# Patient Record
Sex: FEMALE | Race: BLACK OR AFRICAN AMERICAN | Employment: UNEMPLOYED | URBAN - METROPOLITAN AREA
[De-identification: names, ages, dates, MRNs, and addresses within clinical notes are randomized per-mention and may not be internally consistent; named-entity substitution may affect disease eponyms.]

---

## 2017-05-04 ENCOUNTER — OFFICE VISIT (OUTPATIENT)
Dept: FAMILY MEDICINE CLINIC | Age: 52
End: 2017-05-04

## 2017-05-04 VITALS
HEART RATE: 59 BPM | BODY MASS INDEX: 42.85 KG/M2 | TEMPERATURE: 98.7 F | OXYGEN SATURATION: 100 % | WEIGHT: 251 LBS | RESPIRATION RATE: 16 BRPM | HEIGHT: 64 IN | DIASTOLIC BLOOD PRESSURE: 88 MMHG | SYSTOLIC BLOOD PRESSURE: 157 MMHG

## 2017-05-04 DIAGNOSIS — M25.562 CHRONIC PAIN OF BOTH KNEES: Primary | ICD-10-CM

## 2017-05-04 DIAGNOSIS — M25.561 CHRONIC PAIN OF BOTH KNEES: Primary | ICD-10-CM

## 2017-05-04 DIAGNOSIS — G89.29 CHRONIC PAIN OF BOTH KNEES: Primary | ICD-10-CM

## 2017-05-04 DIAGNOSIS — R60.0 LOCALIZED EDEMA: ICD-10-CM

## 2017-05-04 RX ORDER — CHOLECALCIFEROL (VITAMIN D3) 125 MCG
CAPSULE ORAL
COMMUNITY
End: 2017-06-15 | Stop reason: ALTCHOICE

## 2017-05-04 RX ORDER — HYDROCHLOROTHIAZIDE 12.5 MG/1
12.5 TABLET ORAL DAILY
Qty: 30 TAB | Refills: 5 | Status: SHIPPED | OUTPATIENT
Start: 2017-05-04 | End: 2018-09-09 | Stop reason: SDUPTHER

## 2017-05-04 NOTE — MR AVS SNAPSHOT
Visit Information Date & Time Provider Department Dept. Phone Encounter #  
 5/4/2017 10:30 AM Janne Ganser, MD [facility] Nanci  [id number] Your Appointments 6/15/2017  1:30 PM  
Follow Up with Janne Ganser, MD  
[facility]) Appt Note: f/u APPOINTMENT  
 [phone] [id number] Upcoming Health Maintenance Date Due DTaP/Tdap/Td series (1 - Tdap) 1/4/1985 PAP AKA CERVICAL CYTOLOGY 1/4/1985 BREAST CANCER SCRN MAMMOGRAM 1/4/2014 FOBT Q 1 YEAR AGE 50-75 1/4/2014 INFLUENZA AGE 9 TO ADULT 8/1/2017 Allergies as of 5/4/2017  Never Reviewed Not on File Current Immunizations  Never Reviewed No immunizations on file. Not reviewed this visit You Were Diagnosed With   
  
 Codes Comments Chronic pain of both knees    -  Primary ICD-10-CM: M25.561, M25.562, G89.29 ICD-9-CM: 719.46, 338.29 Vitals BP Pulse Temp Resp Height(growth percentile) Weight(growth percentile) 157/88 (BP 1 Location: Left arm, BP Patient Position: Sitting) (!) 59 98.7 °F (37.1 °C) (Oral) 16 5' 4\" (1.626 m) 251 lb (113.9 kg) LMP SpO2 BMI OB Status Smoking Status 05/04/2017 (Exact Date) 100% 43.08 kg/m2 Having regular periods Never Smoker Vitals History BMI and BSA Data Body Mass Index Body Surface Area 43.08 kg/m 2 2.27 m 2 Your Updated Medication List  
  
   
This list is accurate as of: 5/4/17 11:44 AM.  Always use your most recent med list.  
  
  
  
  
 ALEVE 220 mg Cap Generic drug:  naproxen sodium Take  by mouth. hydroCHLOROthiazide 12.5 mg tablet Commonly known as:  HYDRODIURIL Take 1 Tab by mouth daily. Prescriptions Printed Refills  
 hydroCHLOROthiazide (HYDRODIURIL) 12.5 mg tablet 5 Sig: Take 1 Tab by mouth daily. Class: Print  Route: Oral  
  
 Introducing [facility] & HEALTH SERVICES! Laurikarsten Garvin introduces GLIIF patient portal. Now you can access parts of your medical record, email your doctor's office, and request medication refills online. 1. In your internet browser, go to [email or web address] 2. Click on the First Time User? Click Here link in the Sign In box. You will see the New Member Sign Up page. 3. Enter your GLIIF Access Code exactly as it appears below. You will not need to use this code after youve completed the sign-up process. If you do not sign up before the expiration date, you must request a new code. · GLIIF Access Code: [email or web address] Expires: 8/2/2017 11:44 AM 
 
4. Enter the last four digits of your Social Security Number (xxxx) and Date of Birth (mm/dd/yyyy) as indicated and click Submit. You will be taken to the next sign-up page. 5. Create a GLIIF ID. This will be your GLIIF login ID and cannot be changed, so think of one that is secure and easy to remember. 6. Create a GLIIF password. You can change your password at any time. 7. Enter your Password Reset Question and Answer. This can be used at a later time if you forget your password. 8. Enter your e-mail address. You will receive e-mail notification when new information is available in [address] 9. Click Sign Up. You can now view and download portions of your medical record. 10. Click the Download Summary menu link to download a portable copy of your medical information. If you have questions, please visit the Frequently Asked Questions section of the GLIIF website. Remember, GLIIF is NOT to be used for urgent needs. For medical emergencies, dial 911. Now available from your iPhone and Android! Please provide this summary of care documentation to your next provider. If you have any questions after today's visit, please call [phone].

## 2017-05-04 NOTE — PROGRESS NOTES
Discharge instructions reviewed with patient    Medication list and understanding of medications reviewed with patient. OTC and herbal medications reviewed and added to med list if applicable  Barriers to adherence assessed. Guidance given regarding new medications this visit, including reason for taking this medicine, and common side effects. Given AVS, [facility] for [facility] for x-rays obtained. F/u appointment made for late June.

## 2017-05-04 NOTE — PROGRESS NOTES
Chief Complaint   Patient presents with    Knee Pain     right knee     1. Have you been to the ER, urgent care clinic since your last visit? Hospitalized since your last visit? Yes Reason for visit: [address] general leg pain    2. Have you seen or consulted any other health care providers outside of the [address] [place] [address] since your last visit? No    3. When was your last Pap smear? n/a  When was your last Mammogram? n/a  When was your last Colon screening?n/a    PMH/FH/Social Hx reviewed and updated as needed      Applicable screenings reviewed and updated as needed  Medication reconciliation performed. Patient does not know need medication refills. Health Maintenance reviewed.

## 2017-05-04 NOTE — PROGRESS NOTES
EZIO Minaya is a 47 y.o. female being seen today for   Chief Complaint   Patient presents with    Knee Pain     right knee pt stated she would like to get an injection    . she states that she has had xrays of right knee and dx with OA. Pain for a few years. No pain when seated but is bad when she stands. She was taking naproxen but it is not working any more. Was given tramadol but that did not work at all. Interested in knee injection. Keeps some swelling in her legs and would like to try a fluid pill    working on weight loss     Past Medical History:   Diagnosis Date    Arthritis          ROS  Patient states that she is feeling well. Denies complaints of chest pain, shortness of breath, swelling of legs, dizziness or weakness. she denies nausea, vomiting or diarrhea. Current Outpatient Prescriptions   Medication Sig    naproxen sodium (ALEVE) 220 mg cap Take  by mouth.  hydroCHLOROthiazide (HYDRODIURIL) 12.5 mg tablet Take 1 Tab by mouth daily. No current facility-administered medications for this visit. PE  Visit Vitals    /88 (BP 1 Location: Left arm, BP Patient Position: Sitting)    Pulse (!) 59    Temp 98.7 °F (37.1 °C) (Oral)    Resp 16    Ht 5' 4\" (1.626 m)    Wt 251 lb (113.9 kg)    LMP 05/04/2017 (Exact Date)    SpO2 100%    BMI 43.08 kg/m2        Alert and oriented with normal mood and affect. she is well developed and well nourished . Lungs are clear without wheezing. Heart rate is regular without murmurs or gallops. There is trace lower extremity edema. Right knee with bony enlargement, no erythema. ? effusion? Gait antalgic. No results found for this or any previous visit. Assessment and Plan:        ICD-10-CM ICD-9-CM    1.  Chronic pain of both knees  reveiwed wt loss may help    After parq and consent signed, landmarks identified, cleaned with chlorhexidine, injected from medial inferior approach with 40mg kenalog and 1cc 1% lidocaine wihtout epi. Pt tolerated well. Aftercare reviewed. M25.561 719.46     M25.562 338.29     G89.29     2.  Localized edema R60.0 782.3       knee Injection today  Start hctz for edema  Follow up 1-3 mos recheck edema/bp/labs      Rosie Rios MD

## 2017-05-18 ENCOUNTER — DOCUMENTATION ONLY (OUTPATIENT)
Dept: FAMILY MEDICINE CLINIC | Age: 52
End: 2017-05-18

## 2017-05-18 NOTE — PROGRESS NOTES
Knee xray from [facility] received.    Right knee with severe tricompartmental OA and small effusion 4/14/17    Will scan to media tab

## 2017-06-15 ENCOUNTER — HOSPITAL ENCOUNTER (OUTPATIENT)
Dept: LAB | Age: 52
Discharge: HOME | End: 2017-06-15

## 2017-06-15 ENCOUNTER — OFFICE VISIT (OUTPATIENT)
Dept: FAMILY MEDICINE CLINIC | Age: 52
End: 2017-06-15

## 2017-06-15 VITALS
RESPIRATION RATE: 7 BRPM | HEIGHT: 64 IN | HEART RATE: 79 BPM | DIASTOLIC BLOOD PRESSURE: 79 MMHG | WEIGHT: 255 LBS | BODY MASS INDEX: 43.54 KG/M2 | TEMPERATURE: 98.4 F | SYSTOLIC BLOOD PRESSURE: 136 MMHG | OXYGEN SATURATION: 96 %

## 2017-06-15 DIAGNOSIS — R60.0 LOCALIZED EDEMA: ICD-10-CM

## 2017-06-15 DIAGNOSIS — M17.11 PRIMARY OSTEOARTHRITIS OF RIGHT KNEE: Primary | ICD-10-CM

## 2017-06-15 LAB
ALBUMIN SERPL BCP-MCNC: 3.3 G/DL (ref 3.4–5)
ALBUMIN/GLOB SERPL: 0.8 {RATIO} (ref 0.8–1.7)
ALP SERPL-CCNC: 103 U/L (ref 45–117)
ALT SERPL-CCNC: 18 U/L (ref 13–56)
ANION GAP BLD CALC-SCNC: 10 MMOL/L (ref 3–18)
AST SERPL W P-5'-P-CCNC: 17 U/L (ref 15–37)
BASOPHILS # BLD AUTO: 0 K/UL (ref 0–0.06)
BASOPHILS # BLD: 1 % (ref 0–2)
BILIRUB SERPL-MCNC: 0.3 MG/DL (ref 0.2–1)
BUN SERPL-MCNC: 17 MG/DL (ref 7–18)
BUN/CREAT SERPL: 23 (ref 12–20)
CALCIUM SERPL-MCNC: 8 MG/DL (ref 8.5–10.1)
CHLORIDE SERPL-SCNC: 104 MMOL/L (ref 100–108)
CHOLEST SERPL-MCNC: 142 MG/DL
CO2 SERPL-SCNC: 26 MMOL/L (ref 21–32)
CREAT SERPL-MCNC: 0.75 MG/DL (ref 0.6–1.3)
DIFFERENTIAL METHOD BLD: ABNORMAL
EOSINOPHIL # BLD: 0.2 K/UL (ref 0–0.4)
EOSINOPHIL NFR BLD: 3 % (ref 0–5)
ERYTHROCYTE [DISTWIDTH] IN BLOOD BY AUTOMATED COUNT: 17.3 % (ref 11.6–14.5)
EST. AVERAGE GLUCOSE BLD GHB EST-MCNC: ABNORMAL MG/DL
GLOBULIN SER CALC-MCNC: 3.9 G/DL (ref 2–4)
GLUCOSE SERPL-MCNC: 111 MG/DL (ref 74–99)
HBA1C MFR BLD: <3.5 % (ref 4.2–5.6)
HCT VFR BLD AUTO: 27.8 % (ref 35–45)
HDLC SERPL-MCNC: 71 MG/DL (ref 40–60)
HDLC SERPL: 2 {RATIO} (ref 0–5)
HGB BLD-MCNC: 8 G/DL (ref 12–16)
LDLC SERPL CALC-MCNC: 56.4 MG/DL (ref 0–100)
LIPID PROFILE,FLP: ABNORMAL
LYMPHOCYTES # BLD AUTO: 38 % (ref 21–52)
LYMPHOCYTES # BLD: 2.1 K/UL (ref 0.9–3.6)
MCH RBC QN AUTO: 22.5 PG (ref 24–34)
MCHC RBC AUTO-ENTMCNC: 28.8 G/DL (ref 31–37)
MCV RBC AUTO: 78.3 FL (ref 74–97)
MONOCYTES # BLD: 0.6 K/UL (ref 0.05–1.2)
MONOCYTES NFR BLD AUTO: 11 % (ref 3–10)
NEUTS SEG # BLD: 2.6 K/UL (ref 1.8–8)
NEUTS SEG NFR BLD AUTO: 47 % (ref 40–73)
PLATELET # BLD AUTO: 464 K/UL (ref 135–420)
PMV BLD AUTO: 11.3 FL (ref 9.2–11.8)
POTASSIUM SERPL-SCNC: 3.8 MMOL/L (ref 3.5–5.5)
PROT SERPL-MCNC: 7.2 G/DL (ref 6.4–8.2)
RBC # BLD AUTO: 3.55 M/UL (ref 4.2–5.3)
SODIUM SERPL-SCNC: 140 MMOL/L (ref 136–145)
TRIGL SERPL-MCNC: 73 MG/DL (ref ?–150)
TSH SERPL DL<=0.05 MIU/L-ACNC: 2.73 UIU/ML (ref 0.36–3.74)
VLDLC SERPL CALC-MCNC: 14.6 MG/DL
WBC # BLD AUTO: 5.4 K/UL (ref 4.6–13.2)

## 2017-06-15 PROCEDURE — 80053 COMPREHEN METABOLIC PANEL: CPT | Performed by: FAMILY MEDICINE

## 2017-06-15 PROCEDURE — 85025 COMPLETE CBC W/AUTO DIFF WBC: CPT | Performed by: FAMILY MEDICINE

## 2017-06-15 PROCEDURE — 84443 ASSAY THYROID STIM HORMONE: CPT | Performed by: FAMILY MEDICINE

## 2017-06-15 PROCEDURE — 80061 LIPID PANEL: CPT | Performed by: FAMILY MEDICINE

## 2017-06-15 PROCEDURE — 83036 HEMOGLOBIN GLYCOSYLATED A1C: CPT | Performed by: FAMILY MEDICINE

## 2017-06-15 RX ORDER — DICLOFENAC SODIUM 75 MG/1
75 TABLET, DELAYED RELEASE ORAL 2 TIMES DAILY
Qty: 60 TAB | Refills: 2 | Status: SHIPPED | OUTPATIENT
Start: 2017-06-15 | End: 2017-09-27 | Stop reason: SDUPTHER

## 2017-06-15 NOTE — PROGRESS NOTES
Chief Complaint   Patient presents with    Hypertension     1. Have you been to the ER, urgent care clinic since your last visit? Hospitalized since your last visit? No    2. Have you seen or consulted any other health care providers outside of the [address] [place] [address] since your last visit? No    3. When was your last Pap smear? No  When was your last Mammogram? No  When was your last Colon screening? No    PMH/FH/Social Hx reviewed and updated as needed      Applicable screenings reviewed and updated as needed  Medication reconciliation performed. Patient does not need medication refills. Health Maintenance reviewed.

## 2017-06-15 NOTE — MR AVS SNAPSHOT
Visit Information Date & Time Provider Department Dept. Phone Encounter #  
 6/15/2017  1:30 PM Jose Sandoval, [address] [id number] Upcoming Health Maintenance Date Due Hepatitis C Screening 1965 DTaP/Tdap/Td series (1 - Tdap) 1/4/1985 PAP AKA CERVICAL CYTOLOGY 1/4/1985 BREAST CANCER SCRN MAMMOGRAM 1/4/2014 FOBT Q 1 YEAR AGE 50-75 1/4/2014 INFLUENZA AGE 9 TO ADULT 8/1/2017 Allergies as of 6/15/2017  Review Complete On: 6/15/2017 By: Joo Morrow No Known Allergies Current Immunizations  Never Reviewed No immunizations on file. Not reviewed this visit You Were Diagnosed With   
  
 Codes Comments Primary osteoarthritis of right knee    -  Primary ICD-10-CM: M17.11 ICD-9-CM: 715.16 Localized edema     ICD-10-CM: R60.0 ICD-9-CM: 461. 3 Vitals BP Pulse Temp Resp Height(growth percentile) Weight(growth percentile) 136/79 (BP 1 Location: Left arm, BP Patient Position: Sitting) 79 98.4 °F (36.9 °C) (Oral) (!) 7 5' 4\" (1.626 m) 255 lb (115.7 kg) LMP SpO2 BMI OB Status Smoking Status 05/17/2017 96% 43.77 kg/m2 Having regular periods Never Smoker Vitals History BMI and BSA Data Body Mass Index Body Surface Area 43.77 kg/m 2 2.29 m 2 Your Updated Medication List  
  
   
This list is accurate as of: 6/15/17  2:43 PM.  Always use your most recent med list.  
  
  
  
  
 diclofenac EC 75 mg EC tablet Commonly known as:  VOLTAREN Take 1 Tab by mouth two (2) times a day. hydroCHLOROthiazide 12.5 mg tablet Commonly known as:  HYDRODIURIL Take 1 Tab by mouth daily. Prescriptions Printed Refills  
 diclofenac EC (VOLTAREN) 75 mg EC tablet 2 Sig: Take 1 Tab by mouth two (2) times a day. Class: Print Route: Oral  
  
We Performed the Following REFERRAL TO ORTHOPEDICS [QND423 Custom] To-Do List   
 06/20/2017 3:40 PM  
 Appointment with Davide Gomez DO at [address] [place] [address], [address] and [facility] - . Mika Perez 124 ([phone]) Referral Information Referral ID Referred By Referred To  
  
 [id number] Merlin Monday [address] [facility] Not Available Visits Status Start Date End Date 1 New Request 6/15/17 6/15/18 If your referral has a status of pending review or denied, additional information will be sent to support the outcome of this decision. Introducing [facility] & HEALTH SERVICES! Satnam Barajas introduces Viralytics patient portal. Now you can access parts of your medical record, email your doctor's office, and request medication refills online. 1. In your internet browser, go to [email or web address] 2. Click on the First Time User? Click Here link in the Sign In box. You will see the New Member Sign Up page. 3. Enter your Viralytics Access Code exactly as it appears below. You will not need to use this code after youve completed the sign-up process. If you do not sign up before the expiration date, you must request a new code. · Viralytics Access Code: [email or web address] Expires: 8/2/2017 11:44 AM 
 
4. Enter the last four digits of your Social Security Number (xxxx) and Date of Birth (mm/dd/yyyy) as indicated and click Submit. You will be taken to the next sign-up page. 5. Create a Viralytics ID. This will be your Viralytics login ID and cannot be changed, so think of one that is secure and easy to remember. 6. Create a Viralytics password. You can change your password at any time. 7. Enter your Password Reset Question and Answer. This can be used at a later time if you forget your password. 8. Enter your e-mail address. You will receive e-mail notification when new information is available in [address] 9. Click Sign Up. You can now view and download portions of your medical record. 10. Click the Download Summary menu link to download a portable copy of your medical information. If you have questions, please visit the Frequently Asked Questions section of the Kobojot website. Remember, Makoo is NOT to be used for urgent needs. For medical emergencies, dial 911. Now available from your iPhone and Android! Please provide this summary of care documentation to your next provider. If you have any questions after today's visit, please call [phone].

## 2017-06-15 NOTE — PROGRESS NOTES
HPI  Madiha Bone is a 47 y.o. female being seen today for   Chief Complaint   Patient presents with    Procedure     Knees injections   . she states that the knee injection helped for 2 weeks only. hctz has really helped swelling in legs and no side effects from it. She would like to continue. Past Medical History:   Diagnosis Date    Arthritis          ROS  Patient states that she is feeling well. Denies complaints of chest pain, shortness of breath, swelling of legs, dizziness or weakness. she denies nausea, vomiting or diarrhea. Current Outpatient Prescriptions   Medication Sig    diclofenac EC (VOLTAREN) 75 mg EC tablet Take 1 Tab by mouth two (2) times a day.  hydroCHLOROthiazide (HYDRODIURIL) 12.5 mg tablet Take 1 Tab by mouth daily. No current facility-administered medications for this visit. PE  Visit Vitals    /79 (BP 1 Location: Left arm, BP Patient Position: Sitting)    Pulse 79    Temp 98.4 °F (36.9 °C) (Oral)    Resp (!) 7    Ht 5' 4\" (1.626 m)    Wt 255 lb (115.7 kg)    LMP 05/17/2017    SpO2 96%    BMI 43.77 kg/m2        Alert and oriented with normal mood and affect. she is well developed and well nourished . Lungs are clear without wheezing. Heart rate is regular without murmurs or gallops. There is no lower extremity edema. No results found for this or any previous visit. Assessment and Plan:        ICD-10-CM ICD-9-CM    1. Primary osteoarthritis of right knee M17.11 715.16    2.  Localized edema T87.9 157.7 METABOLIC PANEL, COMPREHENSIVE      HEMOGLOBIN A1C WITH EAG      CBC WITH AUTOMATED DIFF      LIPID PANEL      TSH 3RD GENERATION      REFERRAL TO ORTHOPEDICS     Labs today  Wt loss   Diclofenac  Referral ortho for severe OA right knee      Vicky Mott MD

## 2017-06-15 NOTE — PROGRESS NOTES
Discharge instructions reviewed with patient    Medication list and understanding of medications reviewed with patient. OTC and herbal medications reviewed and added to med list if applicable  Barriers to adherence assessed. Guidance given regarding new medications this visit, including reason for taking this medicine, and common side effects. Appointment for Ortho made. Labs drawn. Prescription and AVS  given.

## 2017-06-20 ENCOUNTER — OFFICE VISIT (OUTPATIENT)
Dept: ORTHOPEDIC SURGERY | Age: 52
End: 2017-06-20

## 2017-06-20 VITALS
SYSTOLIC BLOOD PRESSURE: 139 MMHG | WEIGHT: 255 LBS | RESPIRATION RATE: 16 BRPM | DIASTOLIC BLOOD PRESSURE: 86 MMHG | HEIGHT: 64 IN | BODY MASS INDEX: 43.54 KG/M2 | OXYGEN SATURATION: 99 % | HEART RATE: 74 BPM | TEMPERATURE: 98.8 F

## 2017-06-20 DIAGNOSIS — M22.41 CHONDROMALACIA PATELLAE, RIGHT: ICD-10-CM

## 2017-06-20 DIAGNOSIS — M17.11 OSTEOARTHROSIS, LOCALIZED, PRIMARY, KNEE, RIGHT: ICD-10-CM

## 2017-06-20 DIAGNOSIS — M70.50 PES ANSERINE BURSITIS: Primary | ICD-10-CM

## 2017-06-20 RX ORDER — METHYLPREDNISOLONE ACETATE 40 MG/ML
40 INJECTION, SUSPENSION INTRA-ARTICULAR; INTRALESIONAL; INTRAMUSCULAR; SOFT TISSUE ONCE
Qty: 1 ML | Refills: 0 | Status: SHIPPED | COMMUNITY
Start: 2017-06-20 | End: 2017-06-20

## 2017-06-20 RX ORDER — LIDOCAINE HYDROCHLORIDE 10 MG/ML
4 INJECTION, SOLUTION EPIDURAL; INFILTRATION; INTRACAUDAL; PERINEURAL ONCE
Qty: 4 ML | Refills: 0 | Status: SHIPPED | COMMUNITY
Start: 2017-06-20 | End: 2017-06-20

## 2017-06-20 NOTE — MR AVS SNAPSHOT
Visit Information Date & Time Provider Department Dept. Phone Encounter #  
 6/20/2017  3:40 PM Yanet Sahu, [address] and [facility] - [id number] [phone] [id number] Follow-up Instructions Return in about 6 weeks (around 8/1/2017) for righ knee OA, PFS, pes. Routing History Upcoming Health Maintenance Date Due Hepatitis C Screening 1965 DTaP/Tdap/Td series (1 - Tdap) 1/4/1985 PAP AKA CERVICAL CYTOLOGY 1/4/1985 BREAST CANCER SCRN MAMMOGRAM 1/4/2014 FOBT Q 1 YEAR AGE 50-75 1/4/2014 INFLUENZA AGE 9 TO ADULT 8/1/2017 Allergies as of 6/20/2017  Review Complete On: 6/20/2017 By: Yanet Sahu, DO No Known Allergies Current Immunizations  Never Reviewed No immunizations on file. Not reviewed this visit You Were Diagnosed With   
  
 Codes Comments Pes anserine bursitis    -  Primary ICD-10-CM: M70.50 ICD-9-CM: 726.61 Chondromalacia patellae, right     ICD-10-CM: M22.41 
ICD-9-CM: 717.7 Osteoarthrosis, localized, primary, knee, right     ICD-10-CM: M17.11 ICD-9-CM: 715.16 Vitals BP Pulse Temp Resp Height(growth percentile) Weight(growth percentile) 139/86 (BP 1 Location: Right arm, BP Patient Position: Sitting) 74 98.8 °F (37.1 °C) (Oral) 16 5' 4\" (1.626 m) 255 lb (115.7 kg) LMP SpO2 BMI OB Status Smoking Status 05/17/2017 99% 43.77 kg/m2 Having regular periods Never Smoker Vitals History BMI and BSA Data Body Mass Index Body Surface Area 43.77 kg/m 2 2.29 m 2 Preferred Pharmacy Pharmacy Name Phone Ana Crist[facility]1 Denis Dawson, [address] [facility] [phone] Your Updated Medication List  
  
   
This list is accurate as of: 6/20/17  2:54 PM.  Always use your most recent med list.  
  
  
  
  
 diclofenac EC 75 mg EC tablet Commonly known as:  VOLTAREN Take 1 Tab by mouth two (2) times a day. hydroCHLOROthiazide 12.5 mg tablet Commonly known as:  HYDRODIURIL Take 1 Tab by mouth daily. We Performed the Following AMB SUPPLY ORDER [[id number] Custom] Comments:  
 Knee compression sleeve Dx: 
Pes anserine bursitis  (primary encounter diagnosis) Chondromalacia patellae, right Osteoarthrosis, localized, primary, knee, right AMB SUPPLY ORDER [[id number] Custom] Comments:  
 [address] [facility] Dx: primary osteoarthritis right knee AL DRAIN/INJECT LARGE JOINT/BURSA [id number] CPT(R)] REFERRAL TO PHYSICAL THERAPY [JNA23 Custom] Comments:  
 Evaluate and Treat 2 per week for 4 weeks Dry needling and iontophoresis as indicated Follow-up Instructions Return in about 6 weeks (around 8/1/2017) for righ knee OA, PFS, pes. To-Do List   
 06/20/2017 3:40 PM  
  Appointment with Carson Chaudhary DO at [address] [place] [address], [address] and [facility] - Leo Gan ([phone]) Referral Information Referral ID Referred By Referred To  
  
 [id number] [facility] PT [facility]   
   [address] Phone: [phone] Fax: [phone] Visits Status Start Date End Date 1 New Request 6/20/17 6/20/18 If your referral has a status of pending review or denied, additional information will be sent to support the outcome of this decision. Patient Instructions Ice 15-20 minutes at night to get rid of swelling, then neoprene Knee Compression Sleeve during the day to keep it from coming back. May take off at night. If slipping down can fold an inch of the top over or spray knee with a little hair spray prior to putting on. 
 
[email or web address] Search YouTube for my channel: 
 
Dr. Jong Butler Pes Anserine/Plica Joint Injections: Care Instructions Your Care Instructions Joint injections are shots into a joint, such as the knee.  They may be used to put in medicines, such as pain relievers. Or they can be used to take out fluid. Sometimes the fluid is tested in a lab. This can help find the cause of a joint problem. A corticosteroid, or steroid, shot is used to reduce inflammation in tendons or joints. It is often used to treat problems such as arthritis, tendinitis, and bursitis. Steroids can be injected directly into a painful, inflamed joint. They can also help reduce inflammation of a bursa. A bursa is a sac of fluid. It cushions and lubricates areas where tendons, ligaments, skin, muscles, or bones rub against each other. A steroid shot can sometimes help with short-term pain relief when other treatments haven't worked. If steroid shots help, pain may improve for weeks or months. Follow-up care is a key part of your treatment and safety. Be sure to make and go to all appointments, and call your doctor if you are having problems. It's also a good idea to know your test results and keep a list of the medicines you take. How can you care for yourself at home? · Put ice or a cold pack on the area for 10 to 20 minutes at a time. Put a thin cloth between the ice and your skin. · Take anti-inflammatory medicines to reduce pain, swelling, or inflammation. These include ibuprofen (Advil, Motrin) and naproxen (Aleve). Read and follow all instructions on the label. · Avoid strenuous activities for several days, especially those that put stress on the area where you got the shot. · If you have dressings over the area, keep them clean and dry. You may remove them when your doctor tells you to. When should you call for help? Call your doctor now or seek immediate medical care if: 
· You have signs of infection, such as: 
¨ Increased pain, swelling, warmth, or redness. ¨ Red streaks leading from the site. ¨ Pus draining from the site. ¨ A fever. Watch closely for changes in your health, and be sure to contact your doctor if you have any problems. Where can you learn more? Go to http://tennille-anaid.info/. Enter N616 in the search box to learn more about \"Joint Injections: Care Instructions. \" Current as of: March 21, 2017 Content Version: 11.3 © 2377-3049 [employer]. Care instructions adapted under license by Watt[employer] (which disclaims liability or warranty for this information). If you have questions about a medical condition or this instruction, always ask your healthcare professional. [address] any warranty or liability for your use of this information. Introducing [facility] & HEALTH SERVICES! Joan Madrid introduces Synthelis patient portal. Now you can access parts of your medical record, email your doctor's office, and request medication refills online. 1. In your internet browser, go to [email or web address] 2. Click on the First Time User? Click Here link in the Sign In box. You will see the New Member Sign Up page. 3. Enter your Synthelis Access Code exactly as it appears below. You will not need to use this code after youve completed the sign-up process. If you do not sign up before the expiration date, you must request a new code. · Synthelis Access Code: [email or web address] Expires: 8/2/2017 11:44 AM 
 
4. Enter the last four digits of your Social Security Number (xxxx) and Date of Birth (mm/dd/yyyy) as indicated and click Submit. You will be taken to the next sign-up page. 5. Create a Synthelis ID. This will be your Synthelis login ID and cannot be changed, so think of one that is secure and easy to remember. 6. Create a Synthelis password. You can change your password at any time. 7. Enter your Password Reset Question and Answer. This can be used at a later time if you forget your password. 8. Enter your e-mail address. You will receive e-mail notification when new information is available in [address] 9. Click Sign Up. You can now view and download portions of your medical record. 10. Click the Download Summary menu link to download a portable copy of your medical information. If you have questions, please visit the Frequently Asked Questions section of the BeSmart website. Remember, BeSmart is NOT to be used for urgent needs. For medical emergencies, dial 911. Now available from your iPhone and Android! Please provide this summary of care documentation to your next provider. If you have any questions after today's visit, please call [phone].

## 2017-06-20 NOTE — PROCEDURES
PROCEDURE NOTE:  Time out: 249pm  * Patient was identified by name and date of birth   * Agreement on procedure being performed was verified  * Risks and Benefits explained to the patient  * Procedure site verified and marked as necessary  * Patient was positioned for comfort  * Consent was signed and verified. Risks/benefits including but not limited to bleeding, infection, and scarring discussed and Pt wishes to proceed with procedure. The area was prepped with betadine. Ethyl chloride spray was not used. Under sterile technique 1cc of 40mg/cc depomedrol and 4cc 1% lidocaine were injected into right knee using lateral approach after confirming synovial fluid aspirated. Sterile gauze used to clean the area. Sterile bandage applied. Blood loss minimal.  Noticed improvement in pain Sx within 5 minutes (now rated 1/10). Tolerated procedure well. Discussed possible signs/Sx of infxn, and advised to seek care if concerned.

## 2017-06-20 NOTE — PROGRESS NOTES
HISTORY OF PRESENT Kala Collado is a 47y.o. year old female comes in today as new patient to be evaluated and treated at the request of Dr. Slick Noel for my opinion/advice regarding: Right knee pian    Patients symptoms have been present for years. Pain level 5/10 right knee, It has improved with cortisone injection May 2017 and voltaren. It is described as pain and swelling in knee progressive w/o known cause. Walks w/ cane. IMAGING: XR right knee April 2017 severe tricompartmental OA per report    Social History     Social History    Marital status: SINGLE     Spouse name: N/A    Number of children: N/A    Years of education: N/A     Social History Main Topics    Smoking status: Never Smoker    Smokeless tobacco: Never Used    Alcohol use No    Drug use: No    Sexual activity: Not on file     Other Topics Concern    Not on file     Social History Narrative     Current Outpatient Prescriptions   Medication Sig Dispense Refill    diclofenac EC (VOLTAREN) 75 mg EC tablet Take 1 Tab by mouth two (2) times a day. 60 Tab 2    hydroCHLOROthiazide (HYDRODIURIL) 12.5 mg tablet Take 1 Tab by mouth daily. 30 Tab 5     Past Medical History:   Diagnosis Date    Arthritis      Family History   Problem Relation Age of Onset    Cancer Mother     Hypertension Father     Hypertension Sister          ROS:  No numb, tingle. All other systems reviewed and negative aside from that written in the HPI. Objective:  Visit Vitals    Resp 16    Ht 5' 4\" (1.626 m)    Wt 255 lb (115.7 kg)    LMP 05/17/2017    BMI 43.77 kg/m2     HEENT: Conjunctiva/lids WNL. External canals/nares WNL. Tongue midline. PERRL, EOMI. Hearing intact. NECK: Trachea midline. Supple, Full ROM. No thyromegaly. CARDIAC: no edema  LUNGS: normal effort. ABD: Soft, no masses. No HSM. PSYCH: A+O x3. Appropriate judgment and insight. GEN: Appears stated age in NAD. HEAD:  Normocephalic, atraumatic.   NEURO:  Sensation intact light touch B/L lower extremities. MS:  LE Strength +5/5 bilateral .   right Knee:  2+ Effusion . Lachman negative. Valgus negative. Varus negative. negative joint line tenderness medial, lateral.  Ofe negative. Posterior drawer negative. Noble compression test negative. Patellar apprehension negative. Patellar facet  positive tender to palpation medial mild crepitance right. TTP and swollen pes anserine right  EXT: no clubbing/cyanosis. 1+ bilateral edema. SKIN: Warm/dry without rash. Assessment/Plan:     ICD-10-CM ICD-9-CM    1. Pes anserine bursitis M70.50 726.61 NE DRAIN/INJECT LARGE JOINT/BURSA      REFERRAL TO PHYSICAL THERAPY   2. 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 Osteoarthrosis, localized, primary, knee, right M17.11 715.16 REFERRAL TO PHYSICAL THERAPY      AMB SUPPLY ORDER      AMB SUPPLY ORDER     Patient verbalizes understanding of evaluation and plan. Inject pes anserine right and use sleeve and cane and will ice often and continue voltaren and RTC 6 weeks.   HEP per video and start PT.

## 2017-06-20 NOTE — PATIENT INSTRUCTIONS
Ice 15-20 minutes at night to get rid of swelling, then neoprene Knee Compression Sleeve during the day to keep it from coming back. May take off at night. If slipping down can fold an inch of the top over or spray knee with a little hair spray prior to putting on.    [email or web address]    Search YouTube for my channel:    Dr. Dina Anderson    Runner's Knee    Pes Anserine/Plica           Joint Injections: Care Instructions  Your Care Instructions  Joint injections are shots into a joint, such as the knee. They may be used to put in medicines, such as pain relievers. Or they can be used to take out fluid. Sometimes the fluid is tested in a lab. This can help find the cause of a joint problem. A corticosteroid, or steroid, shot is used to reduce inflammation in tendons or joints. It is often used to treat problems such as arthritis, tendinitis, and bursitis. Steroids can be injected directly into a painful, inflamed joint. They can also help reduce inflammation of a bursa. A bursa is a sac of fluid. It cushions and lubricates areas where tendons, ligaments, skin, muscles, or bones rub against each other. A steroid shot can sometimes help with short-term pain relief when other treatments haven't worked. If steroid shots help, pain may improve for weeks or months. Follow-up care is a key part of your treatment and safety. Be sure to make and go to all appointments, and call your doctor if you are having problems. It's also a good idea to know your test results and keep a list of the medicines you take. How can you care for yourself at home? · Put ice or a cold pack on the area for 10 to 20 minutes at a time. Put a thin cloth between the ice and your skin. · Take anti-inflammatory medicines to reduce pain, swelling, or inflammation. These include ibuprofen (Advil, Motrin) and naproxen (Aleve). Read and follow all instructions on the label.   · Avoid strenuous activities for several days, especially those that put stress on the area where you got the shot. · If you have dressings over the area, keep them clean and dry. You may remove them when your doctor tells you to. When should you call for help? Call your doctor now or seek immediate medical care if:  · You have signs of infection, such as:  ¨ Increased pain, swelling, warmth, or redness. ¨ Red streaks leading from the site. ¨ Pus draining from the site. ¨ A fever. Watch closely for changes in your health, and be sure to contact your doctor if you have any problems. Where can you learn more? Go to http://tennille-anaid.info/. Enter N616 in the search box to learn more about \"Joint Injections: Care Instructions. \"  Current as of: March 21, 2017  Content Version: 11.3  © 3123-3168 [employer], [employer]. Care instructions adapted under license by [employer] (which disclaims liability or warranty for this information). If you have questions about a medical condition or this instruction, always ask your healthcare professional. [address] any warranty or liability for your use of this information.

## 2017-06-20 NOTE — LETTER
NAME: Germania Henry : 1965 MRN: [id number] CONSENT FOR TREATMENT/PROCEDURE I authorize Jerrod Lam DO at [facility] to perform the medical treatment and/or procedure(s) described below:  
 
Description of Medical Treatment and/or Procedure(s): (Specify number of treatments or procedures if repeated treatment is recommended) 
 
_____________inject steroid into right pes anserine bursa_________________________ I understand my provider may be assisted with significant procedural tasks by other qualified medical practitioners, who may perform important parts of the treatment/procedure(s) or assist with the administration of analgesia (pain-relieving medications) as necessary for my treatment/procedure(s). These practitioners will only perform tasks within the scope of their licensure and practice, as determined under [place] law and any other applicable regulation(s). Name and Credentials of Practitioners Who May Assist with My Treatment/Procedure(s):  
 
____________________________________________________________ I understand that a medical company representative may be present during my treatment/procedure(s) to observe and provide verbal, technical advice to my provider. I consent to the participation of this representative in my treatment/procedure(s). My provider has explained that unforeseen conditions may be identified during the performance of my treatment/procedure(s) that necessitate an extension of the original treatment/procedure(s) or the performance of procedures other than those identified above. I consent to the performance of additional treatment/procedure(s) by my provider and the qualified medical practitioners assisting my provider as deemed necessary by my provider for treatment of my medical condition(s).   
 
I understand that certain complications may arise during the use of analgesia during my treatment/procedure(s) that may include, but are not limited to, decreased/altered awareness, respiratory problems, drug reactions, paralysis, brain damage, or possibly, death. I understand that the analgesia required for the performance of my treatment/procedure(s) involves additional risks beyond those of the treatment/procedure(s) to be performed, and authorize the use of analgesia by my provider as deemed necessary for the control of pain during my treatment/procedure(s). I understand that my provider may need to change the type of analgesia or medications used, possibly without explanation to me, and I consent to any such changes as deemed necessary by my provider for treatment of my medical condition(s). I understand that there are risks of infection and other unexpected complications associated with any medical or surgical treatment/procedure including the treatment/procedure(s) listed above or other treatment/procedure(s) necessitated by my medical condition, and that such complications may occur in the absence of any negligence on the part of my healthcare providers. My provider has explained to my satisfaction my medical condition and the specific treatment/procedure(s) recommended and identified above. I have been given an opportunity to ask and have answered to my satisfaction questions about: (CONTINUED PAGE 2) NAME: Jacinda Horn : 1965 MRN: [id number]  The nature and extent of the treatment/procedure(s) to be performed;  The benefit of treatment, and the risks associated with not having the recommended treatment/procedure(s);  The risks and possible complications associated with having the treatment, including those which, even though   
   unlikely to occur, may involve serious consequences;  
 Analgesia and alternative forms of analgesia;  Alternative procedures and methods of treatment, and the risk associated with each;  
  The expected consequences of the treatment/procedure(s) on my future health. I understand that no assurance can be given that the treatment/procedure(s) performed will be a success, and no guarantee or warranty of success for the treatment/procedure(s) has been given to me by my provider. I consent to the disposal by the examining Pathologist of any tissue removed during my treatment/procedure(s) in accordance with the receiving hospitals or laboratorys policy and any associated regulations specific to such disposal.  
 
I DO_____  DO NOT___x___ consent to other health care personnel observing my treatment/procedure(s) for the purpose of medical education or other specified purposes as may be explained by my provider. I DO_______ DO NOT____x____ consent to photography or videotaping of all or any part of my treatment/procedure(s) for medical and/or educational purposes. I understand that my identity will not be revealed in any photographs, videos, or accompanying explanations should these images be used by my healthcare providers. I certify that I have read and fully understand the above Consent for Treatment/Procedure and that all blanks were completed before I signed this consent.  
 
__________Rafaelcristina KORY Domínguez_________                      _______________ Print Name of Patient or Legal Representative        Relationship to Patient (if not self) 
 
____________________________________            __________________________ Signature of Patient (or legal representative)  Witness to signature    
 
                       __6/20/2017___/_________AM/PM 
                                                                   Date/Time (If patient is a minor or is unable to sign, complete the following) Patient is a minor, ________ years of age, or is unable to sign due to:______________________ I have explained the nature, purpose and anticipated benefits as well as any possible alternative methods of treatment, the known risks that are involved, and the possibility of complications of the proposed procedure(s) to the patient or patients legal representative. I have provided the patient or legal representative with an opportunity to ask and have answered to their satisfaction any questions about the proposed treatment/procedure(s) and alternative methods of treatment.   
 
Provider Signature:___________________  Date:__6/20/2017__Time:_____________AM/PM

## 2017-06-21 ENCOUNTER — TELEPHONE (OUTPATIENT)
Dept: FAMILY MEDICINE CLINIC | Age: 52
End: 2017-06-21

## 2017-06-21 NOTE — TELEPHONE ENCOUNTER
Patient's phone no longer in service. Called [employer] [facility], left [facility] phone and  requested patient call and schedule a follow up.

## 2017-07-06 ENCOUNTER — OFFICE VISIT (OUTPATIENT)
Dept: FAMILY MEDICINE CLINIC | Age: 52
End: 2017-07-06

## 2017-07-06 VITALS
WEIGHT: 255 LBS | OXYGEN SATURATION: 98 % | HEART RATE: 63 BPM | TEMPERATURE: 98.4 F | SYSTOLIC BLOOD PRESSURE: 141 MMHG | HEIGHT: 64 IN | DIASTOLIC BLOOD PRESSURE: 77 MMHG | BODY MASS INDEX: 43.54 KG/M2 | RESPIRATION RATE: 17 BRPM

## 2017-07-06 DIAGNOSIS — D64.9 ANEMIA, UNSPECIFIED TYPE: Primary | ICD-10-CM

## 2017-07-06 DIAGNOSIS — M17.11 PRIMARY OSTEOARTHRITIS OF RIGHT KNEE: ICD-10-CM

## 2017-07-06 NOTE — MR AVS SNAPSHOT
Visit Information Date & Time Provider Department Dept. Phone Encounter #  
 7/6/2017  2:45 PM Torie Lemus, [address] [id number] Upcoming Health Maintenance Date Due Hepatitis C Screening 1965 DTaP/Tdap/Td series (1 - Tdap) 1/4/1985 PAP AKA CERVICAL CYTOLOGY 1/4/1985 BREAST CANCER SCRN MAMMOGRAM 1/4/2014 FOBT Q 1 YEAR AGE 50-75 1/4/2014 INFLUENZA AGE 9 TO ADULT 8/1/2017 Allergies as of 7/6/2017  Review Complete On: 7/6/2017 By: Tasha Buckner No Known Allergies Current Immunizations  Never Reviewed No immunizations on file. Not reviewed this visit You Were Diagnosed With   
  
 Codes Comments Anemia, unspecified type    -  Primary ICD-10-CM: D64.9 ICD-9-CM: 285.9 Primary osteoarthritis of right knee     ICD-10-CM: M17.11 ICD-9-CM: 715.16 Vitals BP Pulse Temp Resp Height(growth percentile) Weight(growth percentile) 141/77 (BP 1 Location: Left arm, BP Patient Position: Sitting) 63 98.4 °F (36.9 °C) (Oral) 17 5' 4\" (1.626 m) 255 lb (115.7 kg) LMP SpO2 BMI OB Status Smoking Status 06/15/2017 98% 43.77 kg/m2 Having regular periods Never Smoker Vitals History BMI and BSA Data Body Mass Index Body Surface Area 43.77 kg/m 2 2.29 m 2 Preferred Pharmacy Pharmacy Name Phone [address] [phone] Your Updated Medication List  
  
   
This list is accurate as of: 7/6/17  3:05 PM.  Always use your most recent med list.  
  
  
  
  
 diclofenac EC 75 mg EC tablet Commonly known as:  VOLTAREN Take 1 Tab by mouth two (2) times a day. hydroCHLOROthiazide 12.5 mg tablet Commonly known as:  HYDRODIURIL Take 1 Tab by mouth daily. To-Do List   
 07/06/2017 Lab:  OCCULT BLOOD, IMMUNOASSAY (FIT) Introducing [facility] & HEALTH SERVICES!    
 Shahbaz Trinh introduces [employer] patient portal. Now you can access parts of your medical record, email your doctor's office, and request medication refills online. 1. In your internet browser, go to [email or web address] 2. Click on the First Time User? Click Here link in the Sign In box. You will see the New Member Sign Up page. 3. Enter your IActionable Access Code exactly as it appears below. You will not need to use this code after youve completed the sign-up process. If you do not sign up before the expiration date, you must request a new code. · IActionable Access Code: [email or web address] Expires: 8/2/2017 11:44 AM 
 
4. Enter the last four digits of your Social Security Number (xxxx) and Date of Birth (mm/dd/yyyy) as indicated and click Submit. You will be taken to the next sign-up page. 5. Create a IActionable ID. This will be your IActionable login ID and cannot be changed, so think of one that is secure and easy to remember. 6. Create a IActionable password. You can change your password at any time. 7. Enter your Password Reset Question and Answer. This can be used at a later time if you forget your password. 8. Enter your e-mail address. You will receive e-mail notification when new information is available in [address] 9. Click Sign Up. You can now view and download portions of your medical record. 10. Click the Download Summary menu link to download a portable copy of your medical information. If you have questions, please visit the Frequently Asked Questions section of the IActionable website. Remember, IActionable is NOT to be used for urgent needs. For medical emergencies, dial 911. Now available from your iPhone and Android! Please provide this summary of care documentation to your next provider. If you have any questions after today's visit, please call [phone].

## 2017-07-06 NOTE — PROGRESS NOTES
EZIO Johnson is a 47 y.o. female being seen today for   Chief Complaint   Patient presents with   [facility] Results   . she states that she was called in for anemia. Per pt she has had anemia in the past, she thinks due to iron deficiency. She does still have menstrual periods but they are lighter than they used to be. She denies blood in stool. Has not had colon cancer screening. Past Medical History:   Diagnosis Date    Arthritis          ROS  Patient states that she is feeling well. Denies complaints of chest pain, shortness of breath, swelling of legs, dizziness or weakness. she denies nausea, vomiting or diarrhea. Current Outpatient Prescriptions   Medication Sig    diclofenac EC (VOLTAREN) 75 mg EC tablet Take 1 Tab by mouth two (2) times a day.  hydroCHLOROthiazide (HYDRODIURIL) 12.5 mg tablet Take 1 Tab by mouth daily. No current facility-administered medications for this visit. PE  Visit Vitals    /77 (BP 1 Location: Left arm, BP Patient Position: Sitting)    Pulse 63    Temp 98.4 °F (36.9 °C) (Oral)    Resp 17    Ht 5' 4\" (1.626 m)    Wt 255 lb (115.7 kg)    LMP 06/15/2017    SpO2 98%    BMI 43.77 kg/m2        Alert and oriented with normal mood and affect. she is well developed and well nourished . Lungs are clear without wheezing. Heart rate is regular without murmurs or gallops. There is no lower extremity edema. Assessment and Plan:        ICD-10-CM ICD-9-CM    1. Anemia, unspecified type D64.9 285.9 OCCULT BLOOD, IMMUNOASSAY (FIT)   2.  Primary osteoarthritis of right knee M17.11 715.16      Start iron  FIT test    rtc 2 mos recheck Nettie Andre MD

## 2017-07-06 NOTE — PROGRESS NOTES
Chief Complaint   Patient presents with    Results     1. Have you been to the ER, urgent care clinic since your last visit? Hospitalized since your last visit? No    2. Have you seen or consulted any other health care providers outside of the [address] [place] [address] since your last visit? No    3. When was your last Pap smear? No  When was your last Mammogram? No  When was your last Colon screening? No    PMH/FH/Social Hx reviewed and updated as needed      Applicable screenings reviewed and updated as needed  Medication reconciliation performed. Patient does not need medication refills. Health Maintenance reviewed.

## 2017-07-06 NOTE — PROGRESS NOTES
Discharge instructions reviewed with patient    Medication list and understanding of medications reviewed with patient. OTC and herbal medications reviewed and added to med list if applicable  Barriers to adherence assessed. Guidance given regarding new medications this visit, including reason for taking this medicine, and common side effects. FIT test given to patient AVS givewn to patient.

## 2017-08-03 ENCOUNTER — OFFICE VISIT (OUTPATIENT)
Dept: FAMILY MEDICINE CLINIC | Age: 52
End: 2017-08-03

## 2017-08-03 VITALS
HEART RATE: 73 BPM | HEIGHT: 64 IN | TEMPERATURE: 98.4 F | OXYGEN SATURATION: 99 % | DIASTOLIC BLOOD PRESSURE: 81 MMHG | SYSTOLIC BLOOD PRESSURE: 154 MMHG | RESPIRATION RATE: 16 BRPM

## 2017-08-03 DIAGNOSIS — L30.4 INTERTRIGO: Primary | ICD-10-CM

## 2017-08-03 NOTE — MR AVS SNAPSHOT
Visit Information Date & Time Provider Department Dept. Phone Encounter #  
 8/3/2017  3:30 PM Naomi Sheets [id number] [phone] Upcoming Health Maintenance Date Due Hepatitis C Screening 1965 DTaP/Tdap/Td series (1 - Tdap) 1/4/1985 PAP AKA CERVICAL CYTOLOGY 1/4/1985 BREAST CANCER SCRN MAMMOGRAM 1/4/2014 FOBT Q 1 YEAR AGE 50-75 1/4/2014 INFLUENZA AGE 9 TO ADULT 8/1/2017 Allergies as of 8/3/2017  Review Complete On: 8/3/2017 By: Dada Seek No Known Allergies Current Immunizations  Never Reviewed No immunizations on file. Not reviewed this visit Vitals BP Pulse Temp Resp Height(growth percentile) LMP  
 154/81 (BP 1 Location: Left arm, BP Patient Position: Sitting) 73 98.4 °F (36.9 °C) (Oral) 16 5' 4\" (1.626 m) 06/15/2017 SpO2 OB Status Smoking Status 99% Having regular periods Never Smoker Vitals History Your Updated Medication List  
  
   
This list is accurate as of: 8/3/17  4:19 PM.  Always use your most recent med list.  
  
  
  
  
 diclofenac EC 75 mg EC tablet Commonly known as:  VOLTAREN Take 1 Tab by mouth two (2) times a day. hydroCHLOROthiazide 12.5 mg tablet Commonly known as:  HYDRODIURIL Take 1 Tab by mouth daily. Introducing [facility] & HE[facility] SERVICES! [employer] introduces Dropcam patient portal. Now you can access parts of your medical record, email your doctor's office, and request medication refills online. 1. In your internet browser, go to [email or web address] 2. Click on the First Time User? Click Here link in the Sign In box. You will see the New Member Sign Up page. 3. Enter your Dropcam Access Code exactly as it appears below. You will not need to use this code after youve completed the sign-up process. If you do not sign up before the expiration date, you must request a new code. · [employer] Access Code: [email or web address] Expires: 11/1/2017  4:19 PM 
 
4. Enter the last four digits of your Social Security Number (xxxx) and Date of Birth (mm/dd/yyyy) as indicated and click Submit. You will be taken to the next sign-up page. 5. Create a [employer] ID. This will be your [employer] login ID and cannot be changed, so think of one that is secure and easy to remember. 6. Create a [employer] password. You can change your password at any time. 7. Enter your Password Reset Question and Answer. This can be used at a later time if you forget your password. 8. Enter your e-mail address. You will receive e-mail notification when new information is available in [address] 9. Click Sign Up. You can now view and download portions of your medical record. 10. Click the Download Summary menu link to download a portable copy of your medical information. If you have questions, please visit the Frequently Asked Questions section of the [employer] website. Remember, [employer] is NOT to be used for urgent needs. For medical emergencies, dial 911. Now available from your iPhone and Android! Please provide this summary of care documentation to your next provider. If you have any questions after today's visit, please call [phone].

## 2017-08-03 NOTE — PROGRESS NOTES
No chief complaint on file. 1. Have you been to the ER, urgent care clinic since your last visit? Hospitalized since your last visit? No    2. Have you seen or consulted any other health care providers outside of the [address] [place] [address] since your last visit? No    3. When was your last Pap smear? No  When was your last Mammogram? No  When was your last Colon screening? No    PMH/FH/Social Hx reviewed and updated as needed      Applicable screenings reviewed and updated as needed  Medication reconciliation performed. Patient does not need medication refills. Health Maintenance reviewed.

## 2017-08-07 RX ORDER — CLOTRIMAZOLE AND BETAMETHASONE DIPROPIONATE 10; .5 MG/ML; MG/ML
LOTION TOPICAL 2 TIMES DAILY
Qty: 30 ML | Refills: 1
Start: 2017-08-07 | End: 2018-09-19 | Stop reason: SDUPTHER

## 2017-08-07 NOTE — PROGRESS NOTES
EZIO Ho is a 47 y.o. female being seen today for   Chief Complaint   Patient presents with    Follow-up     Pt stated she has chaf under her breast and between her legs and will like a cream. She also stated she has cramps in her legs. .  she states that she has been taking iron and feeling a little better energy. For a few weeks has itchy rash under breasts and between legs. In the past responded to combination steroid/antigungal cream    Past Medical History:   Diagnosis Date    Arthritis          ROS  Patient states that she is feeling well. Denies complaints of chest pain, shortness of breath, swelling of legs, dizziness or weakness. she denies nausea, vomiting or diarrhea. Current Outpatient Prescriptions   Medication Sig    diclofenac EC (VOLTAREN) 75 mg EC tablet Take 1 Tab by mouth two (2) times a day.  hydroCHLOROthiazide (HYDRODIURIL) 12.5 mg tablet Take 1 Tab by mouth daily. No current facility-administered medications for this visit. PE  Visit Vitals    /81 (BP 1 Location: Left arm, BP Patient Position: Sitting)    Pulse 73    Temp 98.4 °F (36.9 °C) (Oral)    Resp 16    Ht 5' 4\" (1.626 m)    LMP 06/15/2017    SpO2 99%        Alert and oriented with normal mood and affect. she is well developed and well nourished . Lungs are clear without wheezing. Heart rate is regular without murmurs or gallops. There is no lower extremity edema. Assessment and Plan:        ICD-10-CM ICD-9-CM    1. Intertrigo L30.4 695.89      Refill of previous cream given. Follow up in a month to recheck anemia.        Shlomo Nichole MD

## 2017-08-31 ENCOUNTER — TELEPHONE (OUTPATIENT)
Dept: FAMILY MEDICINE CLINIC | Age: 52
End: 2017-08-31

## 2017-09-21 ENCOUNTER — HOSPITAL ENCOUNTER (OUTPATIENT)
Dept: LAB | Age: 52
Discharge: HOME | End: 2017-09-21

## 2017-09-21 ENCOUNTER — OFFICE VISIT (OUTPATIENT)
Dept: FAMILY MEDICINE CLINIC | Age: 52
End: 2017-09-21

## 2017-09-21 VITALS
SYSTOLIC BLOOD PRESSURE: 131 MMHG | OXYGEN SATURATION: 99 % | TEMPERATURE: 98.2 F | HEIGHT: 64 IN | BODY MASS INDEX: 46.61 KG/M2 | WEIGHT: 273 LBS | RESPIRATION RATE: 16 BRPM | DIASTOLIC BLOOD PRESSURE: 74 MMHG | HEART RATE: 52 BPM

## 2017-09-21 DIAGNOSIS — I50.31 ACUTE DIASTOLIC CHF (CONGESTIVE HEART FAILURE) (HCC): ICD-10-CM

## 2017-09-21 DIAGNOSIS — R60.0 LOCALIZED EDEMA: Primary | ICD-10-CM

## 2017-09-21 DIAGNOSIS — M25.572 BILATERAL ANKLE PAIN, UNSPECIFIED CHRONICITY: ICD-10-CM

## 2017-09-21 DIAGNOSIS — M25.571 BILATERAL ANKLE PAIN, UNSPECIFIED CHRONICITY: ICD-10-CM

## 2017-09-21 LAB
ALBUMIN SERPL-MCNC: 3.4 G/DL (ref 3.4–5)
ALBUMIN/GLOB SERPL: 0.9 {RATIO} (ref 0.8–1.7)
ALP SERPL-CCNC: 110 U/L (ref 45–117)
ALT SERPL-CCNC: 27 U/L (ref 13–56)
ANION GAP SERPL CALC-SCNC: 7 MMOL/L (ref 3–18)
AST SERPL-CCNC: 21 U/L (ref 15–37)
BILIRUB SERPL-MCNC: 0.3 MG/DL (ref 0.2–1)
BUN SERPL-MCNC: 18 MG/DL (ref 7–18)
BUN/CREAT SERPL: 25 (ref 12–20)
CALCIUM SERPL-MCNC: 8.1 MG/DL (ref 8.5–10.1)
CHLORIDE SERPL-SCNC: 108 MMOL/L (ref 100–108)
CO2 SERPL-SCNC: 27 MMOL/L (ref 21–32)
CREAT SERPL-MCNC: 0.73 MG/DL (ref 0.6–1.3)
GLOBULIN SER CALC-MCNC: 4 G/DL (ref 2–4)
GLUCOSE SERPL-MCNC: 101 MG/DL (ref 74–99)
POTASSIUM SERPL-SCNC: 4.2 MMOL/L (ref 3.5–5.5)
PROT SERPL-MCNC: 7.4 G/DL (ref 6.4–8.2)
SODIUM SERPL-SCNC: 142 MMOL/L (ref 136–145)

## 2017-09-21 PROCEDURE — 80053 COMPREHEN METABOLIC PANEL: CPT | Performed by: FAMILY MEDICINE

## 2017-09-21 RX ORDER — LANOLIN ALCOHOL/MO/W.PET/CERES
CREAM (GRAM) TOPICAL
COMMUNITY
End: 2017-09-21 | Stop reason: SDUPTHER

## 2017-09-21 RX ORDER — FUROSEMIDE 20 MG/1
20 TABLET ORAL DAILY
Qty: 30 TAB | Refills: 2 | Status: SHIPPED | OUTPATIENT
Start: 2017-09-21 | End: 2019-04-22 | Stop reason: DRUGHIGH

## 2017-09-21 RX ORDER — LANOLIN ALCOHOL/MO/W.PET/CERES
325 CREAM (GRAM) TOPICAL 2 TIMES DAILY
Qty: 60 TAB | Refills: 5 | Status: SHIPPED | OUTPATIENT
Start: 2017-09-21

## 2017-09-21 RX ORDER — METOPROLOL TARTRATE 50 MG/1
50 TABLET ORAL 2 TIMES DAILY
Qty: 60 TAB | Refills: 5 | Status: SHIPPED | OUTPATIENT
Start: 2017-09-21

## 2017-09-21 RX ORDER — FUROSEMIDE 40 MG/1
TABLET ORAL DAILY
COMMUNITY
End: 2017-09-21 | Stop reason: DRUGHIGH

## 2017-09-21 RX ORDER — METOPROLOL TARTRATE 50 MG/1
TABLET ORAL 2 TIMES DAILY
COMMUNITY
End: 2017-09-21 | Stop reason: SDUPTHER

## 2017-09-21 RX ORDER — DICLOFENAC SODIUM 25 MG/1
TABLET, DELAYED RELEASE ORAL 2 TIMES DAILY
COMMUNITY
End: 2017-09-22 | Stop reason: SDUPTHER

## 2017-09-21 NOTE — PATIENT INSTRUCTIONS
Flatfoot: Care Instructions  Your Care Instructions  A flatfoot means that the bottom of your foot does not have the usual arch. One or both of your feet may be flat. You may have inherited flatfeet. Having an injury, being very overweight, or having a condition such as rheumatoid arthritis, stroke, or diabetes also can cause the arch to flatten. Flatfoot usually is not a serious problem. But some people do have pain if they gain weight or stand a lot. You also can have pain when walking or running. You can do exercises and wear pads and roomy shoes to help support your feet. Follow-up care is a key part of your treatment and safety. Be sure to make and go to all appointments, and call your doctor if you are having problems. Its also a good idea to know your test results and keep a list of the medicines you take. How can you care for yourself at home? · Wear shoes with good arch support and lots of room in the toes. · Put heel padding (called a heel cup) or inserts (called orthotics) in your shoes to raise the heel. Orthotics are molded pieces of rubber, leather, or other material that can help cushion and balance your feet. · Try these exercises to stretch your feet and make them stronger, if your doctor says it is okay. ¨ Stretch your calf muscles: Stand about 1 foot from a wall and place the palms of both hands against the wall at chest level. Step back with one foot. Keep that leg straight at the knee, and keep both feet flat on the floor. Your feet should point at the wall or slightly toward the center of your body. Bend your front leg at the knee, and press the wall with both hands until you feel a gentle stretch in your back leg. Hold for at least 15 seconds. Increase to 30 seconds over time. Switch legs and repeat. Do this 2 to 4 times a day. ¨ Stretch your feet: Sit on the floor or a mat with your feet stretched in front of you.  Roll up a towel lengthwise and loop it around the ball of one foot. Hold one end of the towel in each hand and gently pull the towel toward your body. Hold for at least 15 to 30 seconds. Repeat with the other foot. Do this 2 to 4 times a day. ¨ Make your feet stronger: Place a towel on the floor. Sit in a chair in front of the towel with both feet flat on the towel at one end.  the towel with the toes of one foot while keeping the heel of that foot on the floor. (Use your other foot to anchor the towel). Curl your toes to pull the towel toward you. Repeat with the other foot. Do this 2 to 4 times a day. · Take anti-inflammatory medicines such as ibuprofen (Advil, Motrin) and naproxen (Aleve) to reduce pain if your feet or legs hurt. Read and follow all instructions on the label. · Try heat or massage on the area that is causing you pain. Use a heating pad set on low or a warm cloth. Put a thin cloth between the heating pad and your skin. When should you call for help? Watch closely for changes in your health, and be sure to contact your doctor if:  · You have pain in your feet or legs. · You want help to find orthotics to fit your feet. Where can you learn more? Go to http://tennille-anaid.info/. Enter H564 in the search box to learn more about \"Flatfoot: Care Instructions. \"  Current as of: March 21, 2017  Content Version: 11.3  © 1698-0885 [employer]. Care instructions adapted under license by [employer] (which disclaims liability or warranty for this information). If you have questions about a medical condition or this instruction, always ask your healthcare professional. [place] [address] any warranty or liability for your use of this information.

## 2017-09-21 NOTE — MR AVS SNAPSHOT
Visit Information Date & Time Provider Department Dept. Phone Encounter #  
 9/21/2017  1:30 PM Royce Lane MD [address] [place] [address] [id number] Upcoming Health Maintenance Date Due Hepatitis C Screening 1965 DTaP/Tdap/Td series (1 - Tdap) 1/4/1985 PAP AKA CERVICAL CYTOLOGY 1/4/1985 BREAST CANCER SCRN MAMMOGRAM 1/4/2014 FOBT Q 1 YEAR AGE 50-75 1/4/2014 INFLUENZA AGE 9 TO ADULT 8/1/2017 Allergies as of 9/21/2017  Review Complete On: 8/3/2017 By: Dora Sanders No Known Allergies Current Immunizations  Never Reviewed No immunizations on file. Not reviewed this visit You Were Diagnosed With   
  
 Codes Comments Localized edema    -  Primary ICD-10-CM: R60.0 ICD-9-CM: 334. 3 Acute diastolic CHF (congestive heart failure) (HCC)     ICD-10-CM: I50.31 ICD-9-CM: 428.31, 428.0 Bilateral ankle pain, unspecified chronicity     ICD-10-CM: M25.571, M25.572 ICD-9-CM: 719.47 Vitals BP Pulse Temp Resp Height(growth percentile) Weight(growth percentile) 131/74 (BP 1 Location: Left arm, BP Patient Position: Sitting) (!) 52 98.2 °F (36.8 °C) (Oral) 16 5' 4\" (1.626 m) 273 lb (123.8 kg) LMP SpO2 BMI OB Status Smoking Status 09/01/2017 (Approximate) 99% 46.86 kg/m2 Having regular periods Never Smoker Vitals History BMI and BSA Data Body Mass Index Body Surface Area  
 46.86 kg/m 2 2.36 m 2 Your Updated Medication List  
  
   
This list is accurate as of: 9/21/17  2:30 PM.  Always use your most recent med list.  
  
  
  
  
 clotrimazole-betamethasone 1-0.05 % lotion Commonly known as:  Shae Goad Apply  to affected area two (2) times a day. * diclofenac EC 25 mg EC tablet Commonly known as:  VOLTAREN Take  by mouth two (2) times a day. * diclofenac EC 75 mg EC tablet Commonly known as:  VOLTAREN Take 1 Tab by mouth two (2) times a day. ferrous sulfate 325 mg (65 mg iron) tablet Take 1 Tab by mouth two (2) times a day. furosemide 20 mg tablet Commonly known as:  LASIX Take 1 Tab by mouth daily. hydroCHLOROthiazide 12.5 mg tablet Commonly known as:  HYDRODIURIL Take 1 Tab by mouth daily. metoprolol tartrate 50 mg tablet Commonly known as:  LOPRESSOR Take 1 Tab by mouth two (2) times a day. * Notice: This list has 2 medication(s) that are the same as other medications prescribed for you. Read the directions carefully, and ask your doctor or other care provider to review them with you. Prescriptions Printed Refills  
 metoprolol tartrate (LOPRESSOR) 50 mg tablet 5 Sig: Take 1 Tab by mouth two (2) times a day. Class: Print Route: Oral  
 furosemide (LASIX) 20 mg tablet 2 Sig: Take 1 Tab by mouth daily. Class: Print Route: Oral  
 ferrous sulfate 325 mg (65 mg iron) tablet 5 Sig: Take 1 Tab by mouth two (2) times a day. Class: Print Route: Oral  
  
Patient Instructions Flatfoot: Care Instructions Your Care Instructions A flatfoot means that the bottom of your foot does not have the usual arch. One or both of your feet may be flat. You may have inherited flatfeet. Having an injury, being very overweight, or having a condition such as rheumatoid arthritis, stroke, or diabetes also can cause the arch to flatten. Flatfoot usually is not a serious problem. But some people do have pain if they gain weight or stand a lot. You also can have pain when walking or running. You can do exercises and wear pads and roomy shoes to help support your feet. Follow-up care is a key part of your treatment and safety. Be sure to make and go to all appointments, and call your doctor if you are having problems. Its also a good idea to know your test results and keep a list of the medicines you take. How can you care for yourself at home? · Wear shoes with good arch support and lots of room in the toes. · Put heel padding (called a heel cup) or inserts (called orthotics) in your shoes to raise the heel. Orthotics are molded pieces of rubber, leather, or other material that can help cushion and balance your feet. · Try these exercises to stretch your feet and make them stronger, if your doctor says it is okay. ¨ Stretch your calf muscles: Stand about 1 foot from a wall and place the palms of both hands against the wall at chest level. Step back with one foot. Keep that leg straight at the knee, and keep both feet flat on the floor. Your feet should point at the wall or slightly toward the center of your body. Bend your front leg at the knee, and press the wall with both hands until you feel a gentle stretch in your back leg. Hold for at least 15 seconds. Increase to 30 seconds over time. Switch legs and repeat. Do this 2 to 4 times a day. ¨ Stretch your feet: Sit on the floor or a mat with your feet stretched in front of you. Roll up a towel lengthwise and loop it around the ball of one foot. Hold one end of the towel in each hand and gently pull the towel toward your body. Hold for at least 15 to 30 seconds. Repeat with the other foot. Do this 2 to 4 times a day. ¨ Make your feet stronger: Place a towel on the floor. Sit in a chair in front of the towel with both feet flat on the towel at one end.  the towel with the toes of one foot while keeping the heel of that foot on the floor. (Use your other foot to anchor the towel). Curl your toes to pull the towel toward you. Repeat with the other foot. Do this 2 to 4 times a day. · Take anti-inflammatory medicines such as ibuprofen (Advil, Motrin) and naproxen (Aleve) to reduce pain if your feet or legs hurt. Read and follow all instructions on the label. · Try heat or massage on the area that is causing you pain.  Use a heating pad set on low or a warm cloth. Put a thin cloth between the heating pad and your skin. When should you call for help? Watch closely for changes in your health, and be sure to contact your doctor if: 
· You have pain in your feet or legs. · You want help to find orthotics to fit your feet. Where can you learn more? Go to http://tennille-anaid.info/. Enter X163 in the search box to learn more about \"Flatfoot: Care Instructions. \" Current as of: March 21, 2017 Content Version: 11.3 © 5378-3037 [employer]. Care instructions adapted under license by [employer] (which disclaims liability or warranty for this information). If you have questions about a medical condition or this instruction, always ask your healthcare professional. [address] any warranty or liability for your use of this information. Introducing [facility]! [employer] introduces bigtincan patient portal. Now you can access parts of your medical record, email your doctor's office, and request medication refills online. 1. In your internet browser, go to [email or web address] 2. Click on the First Time User? Click Here link in the Sign In box. You will see the New Member Sign Up page. 3. Enter your bigtincan Access Code exactly as it appears below. You will not need to use this code after youve completed the sign-up process. If you do not sign up before the expiration date, you must request a new code. · bigtincan Access Code: [email or web address] Expires: 11/1/2017  4:19 PM 
 
4. Enter the last four digits of your Social Security Number (xxxx) and Date of Birth (mm/dd/yyyy) as indicated and click Submit. You will be taken to the next sign-up page. 5. Create a bigtincan ID. This will be your bigtincan login ID and cannot be changed, so think of one that is secure and easy to remember. 6. Create a Erly password. You can change your password at any time. 7. Enter your Password Reset Question and Answer. This can be used at a later time if you forget your password. 8. Enter your e-mail address. You will receive e-mail notification when new information is available in [address] 9. Click Sign Up. You can now view and download portions of your medical record. 10. Click the Download Summary menu link to download a portable copy of your medical information. If you have questions, please visit the Frequently Asked Questions section of the Erly website. Remember, Erly is NOT to be used for urgent needs. For medical emergencies, dial 911. Now available from your iPhone and Android! Please provide this summary of care documentation to your next provider. If you have any questions after today's visit, please call [phone].

## 2017-09-21 NOTE — PROGRESS NOTES
Discharge instructions reviewed with patient    Medication list and understanding of medications reviewed with patient. OTC and herbal medications reviewed and added to med list if applicable  Barriers to adherence assessed. Guidance given regarding new medications this visit, including reason for taking this medicine, and common side effects. Labs drawn and AVS given appointment for F/U made.

## 2017-09-21 NOTE — PROGRESS NOTES
You may qualify for a Free Mammogram and/or Pap Smear from [facility]. Please call [phone] to start the screening process so you can be scheduled for these important services. The process cannot begin until you make the call to begin screening.

## 2017-09-22 NOTE — PROGRESS NOTES
HPI  Valentina Izaguirre is a 47 y.o. female being seen today for   Chief Complaint   Patient presents with   Major Hospital Follow Up     \"pt stated that she was in [facility]"   . she states that she was recently in hospital and dx with diastolic chf.  Started on lasix and metoprolol. Also given iv iron infusion which she said made her feel better immediately. She is still on her oral iron too. Lasix gave her cramps so she took potassium but it made her nauseated. Over last week has bilateral heel and ankle pain, worse if she walks and first thing in the morning. Past Medical History:   Diagnosis Date    Arthritis          ROS  Patient states that she is feeling well. Denies complaints of chest pain, shortness of breath, swelling of legs, dizziness or weakness. she denies nausea, vomiting or diarrhea. Current Outpatient Prescriptions   Medication Sig    diclofenac EC (VOLTAREN) 25 mg EC tablet Take  by mouth two (2) times a day.  metoprolol tartrate (LOPRESSOR) 50 mg tablet Take 1 Tab by mouth two (2) times a day.  furosemide (LASIX) 20 mg tablet Take 1 Tab by mouth daily.  ferrous sulfate 325 mg (65 mg iron) tablet Take 1 Tab by mouth two (2) times a day.  hydroCHLOROthiazide (HYDRODIURIL) 12.5 mg tablet Take 1 Tab by mouth daily.  clotrimazole-betamethasone (LOTRISONE) 1-0.05 % lotion Apply  to affected area two (2) times a day.  diclofenac EC (VOLTAREN) 75 mg EC tablet Take 1 Tab by mouth two (2) times a day. No current facility-administered medications for this visit. PE  Visit Vitals    /74 (BP 1 Location: Left arm, BP Patient Position: Sitting)    Pulse (!) 52    Temp 98.2 °F (36.8 °C) (Oral)    Resp 16    Ht 5' 4\" (1.626 m)    Wt 273 lb (123.8 kg)    LMP 09/01/2017 (Approximate)    SpO2 99%    BMI 46.86 kg/m2        Alert and oriented with normal mood and affect. she is well developed and well nourished . Lungs are clear without wheezing.  Heart rate is regular without murmurs or gallops. There is no lower extremity edema. Bilateral ankles with varus deformity, very low arch. No tenderness over achilles or heel. Assessment and Plan:        ICD-10-CM ICD-9-CM    1. Localized edema R60.0 782.3    2. Acute diastolic CHF (congestive heart failure) (HCC)  euvolemic appearing today  Will decrease lasix to 20 mg due to cramps  reeval one month or sooner if worsening in any way. Z15.46 509.81 METABOLIC PANEL, COMPREHENSIVE     428.0    3. Bilateral ankle pain, unspecified chronicity    Likely due to medial ankle strain from laxity as well as maybe some plantar fasciiits. Stretches and exercises explained. Try gel heel cups. No better in one month consider podiatry/ortho.   Work on wt loss M25.571 719.47     M25.572             Shlomo Nichole MD

## 2017-10-01 RX ORDER — DICLOFENAC SODIUM 75 MG/1
75 TABLET, DELAYED RELEASE ORAL
Qty: 60 TAB | Refills: 2 | Status: SHIPPED | OUTPATIENT
Start: 2017-10-01 | End: 2018-09-24 | Stop reason: SDUPTHER

## 2018-03-29 ENCOUNTER — APPOINTMENT (OUTPATIENT)
Dept: GENERAL RADIOLOGY | Age: 53
End: 2018-03-29
Attending: EMERGENCY MEDICINE
Payer: SELF-PAY

## 2018-03-29 ENCOUNTER — HOSPITAL ENCOUNTER (EMERGENCY)
Age: 53
Discharge: HOME | End: 2018-03-29
Attending: EMERGENCY MEDICINE
Payer: SELF-PAY

## 2018-03-29 VITALS
RESPIRATION RATE: 16 BRPM | HEART RATE: 62 BPM | OXYGEN SATURATION: 100 % | DIASTOLIC BLOOD PRESSURE: 68 MMHG | TEMPERATURE: 98.2 F | SYSTOLIC BLOOD PRESSURE: 147 MMHG

## 2018-03-29 DIAGNOSIS — R10.84 ABDOMINAL PAIN, GENERALIZED: ICD-10-CM

## 2018-03-29 DIAGNOSIS — R11.2 NON-INTRACTABLE VOMITING WITH NAUSEA, UNSPECIFIED VOMITING TYPE: Primary | ICD-10-CM

## 2018-03-29 LAB
ALBUMIN SERPL-MCNC: 3.4 G/DL (ref 3.4–5)
ALBUMIN/GLOB SERPL: 0.7 {RATIO} (ref 0.8–1.7)
ALP SERPL-CCNC: 93 U/L (ref 45–117)
ALT SERPL-CCNC: 19 U/L (ref 13–56)
ANION GAP SERPL CALC-SCNC: 8 MMOL/L (ref 3–18)
AST SERPL-CCNC: 21 U/L (ref 15–37)
BASOPHILS # BLD: 0 K/UL (ref 0–0.1)
BASOPHILS NFR BLD: 0 % (ref 0–2)
BILIRUB SERPL-MCNC: 0.4 MG/DL (ref 0.2–1)
BUN SERPL-MCNC: 17 MG/DL (ref 7–18)
BUN/CREAT SERPL: 22 (ref 12–20)
CALCIUM SERPL-MCNC: 8.9 MG/DL (ref 8.5–10.1)
CHLORIDE SERPL-SCNC: 107 MMOL/L (ref 100–108)
CK MB CFR SERPL CALC: 1 % (ref 0–4)
CK MB SERPL-MCNC: 1.5 NG/ML (ref 5–25)
CK SERPL-CCNC: 146 U/L (ref 26–192)
CO2 SERPL-SCNC: 27 MMOL/L (ref 21–32)
CREAT SERPL-MCNC: 0.76 MG/DL (ref 0.6–1.3)
DIFFERENTIAL METHOD BLD: ABNORMAL
EOSINOPHIL # BLD: 0.1 K/UL (ref 0–0.4)
EOSINOPHIL NFR BLD: 2 % (ref 0–5)
ERYTHROCYTE [DISTWIDTH] IN BLOOD BY AUTOMATED COUNT: 12.5 % (ref 11.6–14.5)
ETHANOL SERPL-MCNC: <3 MG/DL (ref 0–3)
GLOBULIN SER CALC-MCNC: 4.6 G/DL (ref 2–4)
GLUCOSE SERPL-MCNC: 102 MG/DL (ref 74–99)
HCT VFR BLD AUTO: 38.4 % (ref 35–45)
HGB BLD-MCNC: 12.6 G/DL (ref 12–16)
LACTATE BLD-SCNC: 1.2 MMOL/L (ref 0.4–2)
LYMPHOCYTES # BLD: 2.8 K/UL (ref 0.9–3.6)
LYMPHOCYTES NFR BLD: 40 % (ref 21–52)
MAGNESIUM SERPL-MCNC: 2.5 MG/DL (ref 1.6–2.6)
MCH RBC QN AUTO: 30.7 PG (ref 24–34)
MCHC RBC AUTO-ENTMCNC: 32.8 G/DL (ref 31–37)
MCV RBC AUTO: 93.7 FL (ref 74–97)
MONOCYTES # BLD: 0.7 K/UL (ref 0.05–1.2)
MONOCYTES NFR BLD: 10 % (ref 3–10)
NEUTS SEG # BLD: 3.3 K/UL (ref 1.8–8)
NEUTS SEG NFR BLD: 48 % (ref 40–73)
PLATELET # BLD AUTO: 277 K/UL (ref 135–420)
PMV BLD AUTO: 12.3 FL (ref 9.2–11.8)
POTASSIUM SERPL-SCNC: 3.6 MMOL/L (ref 3.5–5.5)
PROT SERPL-MCNC: 8 G/DL (ref 6.4–8.2)
RBC # BLD AUTO: 4.1 M/UL (ref 4.2–5.3)
SODIUM SERPL-SCNC: 142 MMOL/L (ref 136–145)
TROPONIN I SERPL-MCNC: <0.02 NG/ML (ref 0–0.04)
WBC # BLD AUTO: 7 K/UL (ref 4.6–13.2)

## 2018-03-29 PROCEDURE — 74022 RADEX COMPL AQT ABD SERIES: CPT

## 2018-03-29 PROCEDURE — 83735 ASSAY OF MAGNESIUM: CPT | Performed by: EMERGENCY MEDICINE

## 2018-03-29 PROCEDURE — 96375 TX/PRO/DX INJ NEW DRUG ADDON: CPT

## 2018-03-29 PROCEDURE — 85025 COMPLETE CBC W/AUTO DIFF WBC: CPT | Performed by: EMERGENCY MEDICINE

## 2018-03-29 PROCEDURE — 83605 ASSAY OF LACTIC ACID: CPT

## 2018-03-29 PROCEDURE — 80307 DRUG TEST PRSMV CHEM ANLYZR: CPT | Performed by: EMERGENCY MEDICINE

## 2018-03-29 PROCEDURE — 93005 ELECTROCARDIOGRAM TRACING: CPT

## 2018-03-29 PROCEDURE — 99285 EMERGENCY DEPT VISIT HI MDM: CPT

## 2018-03-29 PROCEDURE — 82550 ASSAY OF CK (CPK): CPT | Performed by: EMERGENCY MEDICINE

## 2018-03-29 PROCEDURE — 80053 COMPREHEN METABOLIC PANEL: CPT | Performed by: EMERGENCY MEDICINE

## 2018-03-29 PROCEDURE — 96361 HYDRATE IV INFUSION ADD-ON: CPT

## 2018-03-29 PROCEDURE — 96374 THER/PROPH/DIAG INJ IV PUSH: CPT

## 2018-03-29 PROCEDURE — 74011250636 HC RX REV CODE- 250/636: Performed by: EMERGENCY MEDICINE

## 2018-03-29 RX ORDER — ONDANSETRON 4 MG/1
8 TABLET, FILM COATED ORAL
Qty: 12 TAB | Refills: 0 | Status: SHIPPED | OUTPATIENT
Start: 2018-03-29 | End: 2018-09-06

## 2018-03-29 RX ORDER — ONDANSETRON 2 MG/ML
4 INJECTION INTRAMUSCULAR; INTRAVENOUS
Status: COMPLETED | OUTPATIENT
Start: 2018-03-29 | End: 2018-03-29

## 2018-03-29 RX ORDER — MORPHINE SULFATE 4 MG/ML
4 INJECTION INTRAVENOUS
Status: COMPLETED | OUTPATIENT
Start: 2018-03-29 | End: 2018-03-29

## 2018-03-29 RX ADMIN — MORPHINE SULFATE 4 MG: 4 INJECTION INTRAVENOUS at 20:11

## 2018-03-29 RX ADMIN — SODIUM CHLORIDE 1000 ML: 900 INJECTION, SOLUTION INTRAVENOUS at 19:42

## 2018-03-29 RX ADMIN — ONDANSETRON 4 MG: 2 INJECTION, SOLUTION INTRAMUSCULAR; INTRAVENOUS at 20:12

## 2018-03-29 NOTE — ED NOTES
When Dr. Gage Uribe asked patient about drinking, patient became upset and reports that she does not drink alcohol. [facility] is at the bedside. Patient states her anxiety was brought on after her dinner made her throw up.

## 2018-03-29 NOTE — ED PROVIDER NOTES
[facility]  [address] [facility] EMERGENCY DEPT      7:41 PM    Date: 3/29/2018  Patient Name: Graeme Concepcion    History of Presenting Illness     Chief Complaint   Patient presents with    Anxiety    Abdominal Pain       History Provided By: Patient    Chief Complaint: Nausea  Duration: A Few Hours Ago  Timing:  Acute  Severity: Moderate  Modifying Factors: None  Associated Symptoms: abdominal pain, shortness of breath, vomiting    46 y.o. female with noted past medical history including arthritis who presents to the emergency department with acute, moderate nausea that began a few hours ago. Pt states that she was eating [place] food for dinner and began to vomit, however she felt nauseous prior to eating. Associated sx include abdominal pain and shortness of breath. Nothing makes nausea better or worse. States that she had chest pain a few days ago. Pt denies smoking and drinking today. Denies diarrhea and runny stool. No other complaints. Nursing notes regarding the HPI and triage nursing notes were reviewed. Prior medical records were reviewed. Current Outpatient Prescriptions   Medication Sig Dispense Refill    diclofenac EC (VOLTAREN) 75 mg EC tablet Take 1 Tab by mouth two (2) times daily as needed. 60 Tab 2    metoprolol tartrate (LOPRESSOR) 50 mg tablet Take 1 Tab by mouth two (2) times a day. 60 Tab 5    furosemide (LASIX) 20 mg tablet Take 1 Tab by mouth daily. 30 Tab 2    ferrous sulfate 325 mg (65 mg iron) tablet Take 1 Tab by mouth two (2) times a day. 60 Tab 5    clotrimazole-betamethasone (LOTRISONE) 1-0.05 % lotion Apply  to affected area two (2) times a day. 30 mL 1    hydroCHLOROthiazide (HYDRODIURIL) 12.5 mg tablet Take 1 Tab by mouth daily. 30 Tab 5       Past History     Past Medical History:  Past Medical History:   Diagnosis Date    Arthritis        Past Surgical History:  No past surgical history on file.     Family History:  Family History   Problem Relation Age of Onset    Cancer Mother     Hypertension Father     Hypertension Sister        Social History:  Social History   Substance Use Topics    Smoking status: Never Smoker    Smokeless tobacco: Never Used    Alcohol use No       Allergies:  No Known Allergies    Patient's primary care provider (as noted in EPIC):  Briana Lerma MD    Review of Systems     Review of Systems   Constitutional: Negative for diaphoresis. HENT: Negative for congestion. Eyes: Negative for discharge. Respiratory: Positive for shortness of breath. Negative for stridor. Cardiovascular: Negative for palpitations. Gastrointestinal: Positive for abdominal pain, nausea and vomiting. Negative for diarrhea. Genitourinary: Negative for flank pain. Musculoskeletal: Negative for back pain. Neurological: Negative for weakness. Psychiatric/Behavioral: Negative for hallucinations. All other systems reviewed and are negative. Physical Exam       Visit Vitals    /68    Pulse 62    Temp 98.2 °F (36.8 °C)    Resp 16    SpO2 100%       PHYSICAL EXAM:    CONSTITUTIONAL:  Alert, in no apparent distress;  well developed;  well nourished. HEAD:  Normocephalic, atraumatic. EYES:  EOMI. Non-icteric sclera. Normal conjunctiva. ENTM:  Nose:  no rhinorrhea. Throat:  no erythema or exudate, mucous membranes moist.  NECK:  No JVD. Supple  RESPIRATORY:  Chest clear, equal breath sounds, good air movement. CARDIOVASCULAR:  Regular rate and rhythm. No murmurs, rubs, or gallops. GI:  Normal bowel sounds, abdomen soft with diffuse mild abdominal TTP. No rebound or guarding. No palpable masses. BACK:  Non-tender. UPPER EXT:  Normal inspection. LOWER EXT:  No edema, no calf tenderness. Distal pulses intact. NEURO:  Moves all four extremities, and grossly normal motor exam.  SKIN:  No rashes;  Normal for age. PSYCH:  Alert and normal affect.     DIFFERENTIAL DIAGNOSES/ MEDICAL DECISION MAKING:  Gastritis, gerd, peptic ulcer disease, cholecystitis, pancreatitis, gastroenteritis, constipation related pain, atypical appendicitis pain, obstruction, atypical cardiac (ami or anginal pain), referred pain from pulmonary process (pneumonia, empyema), or combination of the above versus many other processes. Diagnostic Study Results     Abnormal lab results from this emergency department encounter:  Labs Reviewed   CBC WITH AUTOMATED DIFF - Abnormal; Notable for the following:        Result Value    RBC 4.10 (*)     MPV 12.3 (*)     All other components within normal limits   METABOLIC PANEL, COMPREHENSIVE - Abnormal; Notable for the following:     Glucose 102 (*)     BUN/Creatinine ratio 22 (*)     Globulin 4.6 (*)     A-G Ratio 0.7 (*)     All other components within normal limits   MAGNESIUM   CARDIAC PANEL,(CK, CKMB & TROPONIN)   ETHYL ALCOHOL   DRUG SCREEN, URINE   POC LACTIC ACID       Lab values for this patient within approximately the last 12 hours:  Recent Results (from the past 12 hour(s))   CBC WITH AUTOMATED DIFF    Collection Time: 03/29/18  7:59 PM   Result Value Ref Range    WBC 7.0 4.6 - 13.2 K/uL    RBC 4.10 (L) 4.20 - 5.30 M/uL    HGB 12.6 12.0 - 16.0 g/dL    HCT 38.4 35.0 - 45.0 %    MCV 93.7 74.0 - 97.0 FL    MCH 30.7 24.0 - 34.0 PG    MCHC 32.8 31.0 - 37.0 g/dL    RDW 12.5 11.6 - 14.5 %    PLATELET 777 681 - 354 K/uL    MPV 12.3 (H) 9.2 - 11.8 FL    NEUTROPHILS 48 40 - 73 %    LYMPHOCYTES 40 21 - 52 %    MONOCYTES 10 3 - 10 %    EOSINOPHILS 2 0 - 5 %    BASOPHILS 0 0 - 2 %    ABS. NEUTROPHILS 3.3 1.8 - 8.0 K/UL    ABS. LYMPHOCYTES 2.8 0.9 - 3.6 K/UL    ABS. MONOCYTES 0.7 0.05 - 1.2 K/UL    ABS. EOSINOPHILS 0.1 0.0 - 0.4 K/UL    ABS.  BASOPHILS 0.0 0.0 - 0.1 K/UL    DF AUTOMATED     METABOLIC PANEL, COMPREHENSIVE    Collection Time: 03/29/18  7:59 PM   Result Value Ref Range    Sodium 142 136 - 145 mmol/L    Potassium 3.6 3.5 - 5.5 mmol/L    Chloride 107 100 - 108 mmol/L    CO2 27 21 - 32 mmol/L    Anion gap 8 3.0 - 18 mmol/L Glucose 102 (H) 74 - 99 mg/dL    BUN 17 7.0 - 18 MG/DL    Creatinine 0.76 0.6 - 1.3 MG/DL    BUN/Creatinine ratio 22 (H) 12 - 20      GFR est AA >60 >60 ml/min/1.73m2    GFR est non-AA >60 >60 ml/min/1.73m2    Calcium 8.9 8.5 - 10.1 MG/DL    Bilirubin, total 0.4 0.2 - 1.0 MG/DL    ALT (SGPT) 19 13 - 56 U/L    AST (SGOT) 21 15 - 37 U/L    Alk. phosphatase 93 45 - 117 U/L    Protein, total 8.0 6.4 - 8.2 g/dL    Albumin 3.4 3.4 - 5.0 g/dL    Globulin 4.6 (H) 2.0 - 4.0 g/dL    A-G Ratio 0.7 (L) 0.8 - 1.7     MAGNESIUM    Collection Time: 03/29/18  7:59 PM   Result Value Ref Range    Magnesium 2.5 1.6 - 2.6 mg/dL   CARDIAC PANEL,(CK, CKMB & TROPONIN)    Collection Time: 03/29/18  7:59 PM   Result Value Ref Range     26 - 192 U/L    CK - MB 1.5 <3.6 ng/ml    CK-MB Index 1.0 0.0 - 4.0 %    Troponin-I, Qt. <0.02 0.0 - 0.045 NG/ML   ETHYL ALCOHOL    Collection Time: 03/29/18  7:59 PM   Result Value Ref Range    ALCOHOL(ETHYL),SERUM <3 0 - 3 MG/DL   POC LACTIC ACID    Collection Time: 03/29/18  8:10 PM   Result Value Ref Range    Lactic Acid (POC) 1.2 0.4 - 2.0 mmol/L   EKG, 12 LEAD, SUBSEQUENT    Collection Time: 03/29/18  8:17 PM   Result Value Ref Range    Ventricular Rate 56 BPM    Atrial Rate 56 BPM    P-R Interval 178 ms    QRS Duration 90 ms    Q-T Interval 548 ms    QTC Calculation (Bezet) 528 ms    Calculated P Axis 21 degrees    Calculated R Axis 21 degrees    Calculated T Axis -17 degrees    Diagnosis       Sinus bradycardia  Minimal voltage criteria for LVH, may be normal variant  Nonspecific T wave abnormality  Prolonged QT  Abnormal ECG  No previous ECGs available         Radiologist and cardiologist interpretations if available at time of this note:  No results found. Preliminary review of x-rays by ED Physician. Acute abdominal series xray interpretation shows, Obstructive series negative, Nonspecific bowel gas pattern, no air fluid levels, no free air.       Medication(s) ordered for patient during this emergency visit encounter:  Medications   sodium chloride 0.9 % bolus infusion 1,000 mL (0 mL IntraVENous IV Completed 3/29/18 2059)   ondansetron (ZOFRAN) injection 4 mg (4 mg IntraVENous Given 3/29/18 2012)   morphine 4 mg (4 mg IntraVENous Given 3/29/18 2011)       Medical Decision Making     I am the first provider for this patient. I reviewed the vital signs, available nursing notes, past medical history, past surgical history, family history and social history. Vital Signs:  Reviewed the patient's vital signs. ED COURSE:      IMPRESSION AND MEDICAL DECISION MAKING:  Based upon the patient's presentation with noted HPI and PE, along with the work up done in the emergency department, I believe that the patient is having vomiting and abdominal pain from gastritis. I do believe though that the patient is well enough for discharge home. DIAGNOSIS:  1. Vomiting  2. Abdominal pain    SPECIFIC PATIENT INSTRUCTIONS FROM THE PHYSICIAN WHO TREATED YOU IN THE ER TODAY:  1. IF Zofran was prescribed, take it as prescribed for nausea and/or vomiting. 2. FOLLOW UP APPOINTMENT:  Your primary doctor in 3-4 days. Patient is improved, resting quietly and comfortably. The patient will be discharged home. The patient was reassured that these symptoms do not appear to represent a serious or life threatening condition at this time. Warning signs of worsening condition were discussed and understood by the patient. Based on patient's age, coexisting illness, exam, and the results of this ED evaluation, the decision to treat as an outpatient was made. Based on the information available at time of discharge, acute pathology requiring immediate intervention was deemed relative unlikely. While it is impossible to completely exclude the possibility of underlying serious disease or worsening of condition, I feel the relative likelihood is extremely low.  I discussed this uncertainty with the patient, who understood ED evaluation and treatment and felt comfortable with the outpatient treatment plan. All questions regarding care, test results, and follow up were answered. The patient is stable and appropriate to discharge. They understand that they should return to the emergency department for any new or worsening symptoms. I stressed the importance of follow up for repeat assessment and possibly further evaluation/treatment. Coding Diagnoses     Clinical Impression: No diagnosis found. Disposition     Disposition:  Home. [place] Christensen Board Certified Emergency Physician    Provider Attestation:  If a scribe was utilized in generation of this patient record, I personally performed the services described in the documentation, reviewed the documentation, as recorded by the scribe in my presence, and it accurately records the patient's history of presenting illness, review of systems, patient physical examination, and procedures performed by me as the attending physician. ALBANIA Christensen Board Certified Emergency Physician  3/29/2018.  7:42 PM      Scribe Attestation     Jenelle Sainz acting as a scribe for and in the presence of Jeri Fitzpatrick MD      March 29, 2018 at 9:16 PM

## 2018-03-29 NOTE — ED TRIAGE NOTES
Patient arrived from [employer] home c/o anxiety and alcohol problems. EMS reports patient was day drinking and later had an anxiety attack.  Patient denies SI/HI

## 2018-03-30 LAB
ATRIAL RATE: 56 BPM
CALCULATED P AXIS, ECG09: 21 DEGREES
CALCULATED R AXIS, ECG10: 21 DEGREES
CALCULATED T AXIS, ECG11: -17 DEGREES
DIAGNOSIS, 93000: NORMAL
P-R INTERVAL, ECG05: 178 MS
Q-T INTERVAL, ECG07: 548 MS
QRS DURATION, ECG06: 90 MS
QTC CALCULATION (BEZET), ECG08: 528 MS
VENTRICULAR RATE, ECG03: 56 BPM

## 2018-03-30 NOTE — ED NOTES
I have reviewed discharge instructions with the patient. The patient verbalized understanding. Patient provided prescription for Zofran and instructed on usage. Patient is leaving the ED in stable condition at this time, steady gait noted.

## 2018-03-30 NOTE — DISCHARGE INSTRUCTIONS
SPECIFIC PATIENT INSTRUCTIONS FROM THE PHYSICIAN WHO TREATED YOU IN THE ER TODAY:  1. IF Zofran was prescribed, take it as prescribed for nausea and/or vomiting. 2. FOLLOW UP APPOINTMENT:  Your primary doctor in 3-4 days. Abdominal Pain: Care Instructions  Your Care Instructions    Abdominal pain has many possible causes. Some aren't serious and get better on their own in a few days. Others need more testing and treatment. If your pain continues or gets worse, you need to be rechecked and may need more tests to find out what is wrong. You may need surgery to correct the problem. Don't ignore new symptoms, such as fever, nausea and vomiting, urination problems, pain that gets worse, and dizziness. These may be signs of a more serious problem. Your doctor may have recommended a follow-up visit in the next 8 to 12 hours. If you are not getting better, you may need more tests or treatment. The doctor has checked you carefully, but problems can develop later. If you notice any problems or new symptoms, get medical treatment right away. Follow-up care is a key part of your treatment and safety. Be sure to make and go to all appointments, and call your doctor if you are having problems. It's also a good idea to know your test results and keep a list of the medicines you take. How can you care for yourself at home? · Rest until you feel better. · To prevent dehydration, drink plenty of fluids, enough so that your urine is light yellow or clear like water. Choose water and other caffeine-free clear liquids until you feel better. If you have kidney, heart, or liver disease and have to limit fluids, talk with your doctor before you increase the amount of fluids you drink. · If your stomach is upset, eat mild foods, such as rice, dry toast or crackers, bananas, and applesauce. Try eating several small meals instead of two or three large ones.   · Wait until 48 hours after all symptoms have gone away before you have spicy foods, alcohol, and drinks that contain caffeine. · Do not eat foods that are high in fat. · Avoid anti-inflammatory medicines such as aspirin, ibuprofen (Advil, Motrin), and naproxen (Aleve). These can cause stomach upset. Talk to your doctor if you take daily aspirin for another health problem. When should you call for help? Call 911 anytime you think you may need emergency care. For example, call if:  ? · You passed out (lost consciousness). ? · You pass maroon or very bloody stools. ? · You vomit blood or what looks like coffee grounds. ? · You have new, severe belly pain. ?Call your doctor now or seek immediate medical care if:  ? · Your pain gets worse, especially if it becomes focused in one area of your belly. ? · You have a new or higher fever. ? · Your stools are black and look like tar, or they have streaks of blood. ? · You have unexpected vaginal bleeding. ? · You have symptoms of a urinary tract infection. These may include:  ¨ Pain when you urinate. ¨ Urinating more often than usual.  ¨ Blood in your urine. ? · You are dizzy or lightheaded, or you feel like you may faint. ? Watch closely for changes in your health, and be sure to contact your doctor if:  ? · You are not getting better after 1 day (24 hours). Where can you learn more? Go to http://tennille-anaid.info/. Enter D346 in the search box to learn more about \"Abdominal Pain: Care Instructions. \"  Current as of: March 20, 2017  Content Version: 11.4  © 8418-4458 [employer]. Care instructions adapted under license by [employer] (which disclaims liability or warranty for this information). If you have questions about a medical condition or this instruction, always ask your healthcare professional. [address] any warranty or liability for your use of this information.          Nausea and Vomiting: Care Instructions  Your Care Instructions    When you are nauseated, you may feel weak and sweaty and notice a lot of saliva in your mouth. Nausea often leads to vomiting. Most of the time you do not need to worry about nausea and vomiting, but they can be signs of other illnesses. Two common causes of nausea and vomiting are stomach flu and food poisoning. Nausea and vomiting from viral stomach flu will usually start to improve within 24 hours. Nausea and vomiting from food poisoning may last from 12 to 48 hours. The doctor has checked you carefully, but problems can develop later. If you notice any problems or new symptoms, get medical treatment right away. Follow-up care is a key part of your treatment and safety. Be sure to make and go to all appointments, and call your doctor if you are having problems. It's also a good idea to know your test results and keep a list of the medicines you take. How can you care for yourself at home? · To prevent dehydration, drink plenty of fluids, enough so that your urine is light yellow or clear like water. Choose water and other caffeine-free clear liquids until you feel better. If you have kidney, heart, or liver disease and have to limit fluids, talk with your doctor before you increase the amount of fluids you drink. · Rest in bed until you feel better. · When you are able to eat, try clear soups, mild foods, and liquids until all symptoms are gone for 12 to 48 hours. Other good choices include dry toast, crackers, cooked cereal, and gelatin dessert, such as Jell-O. When should you call for help? Call 911 anytime you think you may need emergency care. For example, call if:  ? · You passed out (lost consciousness). ?Call your doctor now or seek immediate medical care if:  ? · You have symptoms of dehydration, such as:  ¨ Dry eyes and a dry mouth. ¨ Passing only a little dark urine. ¨ Feeling thirstier than usual.   ? · You have new or worsening belly pain.    ? · You have a new or higher fever.   ? · You vomit blood or what looks like coffee grounds. ? Watch closely for changes in your health, and be sure to contact your doctor if:  ? · You have ongoing nausea and vomiting. ? · Your vomiting is getting worse. ? · Your vomiting lasts longer than 2 days. ? · You are not getting better as expected. Where can you learn more? Go to http://tennille-anaid.info/. Enter 25 761952 in the search box to learn more about \"Nausea and Vomiting: Care Instructions. \"  Current as of: 2017  Content Version: 11.4  © 6379-8418 [employer]. Care instructions adapted under license by [employer] (which disclaims liability or warranty for this information). If you have questions about a medical condition or this instruction, always ask your healthcare professional. [address] any warranty or liability for your use of this information. [employer] Activation    Thank you for requesting access to [employer]. Please follow the instructions below to securely access and download your online medical record. [employer] allows you to send messages to your doctor, view your test results, renew your prescriptions, schedule appointments, and more. How Do I Sign Up? 1. In your internet browser, go to [email or web address]. 2. Click on the First Time User? Click Here link in the Sign In box. You will see the New Member Sign Up page. 3. Enter your [employer] Access Code exactly as it appears below. You will not need to use this code after youve completed the sign-up process. If you do not sign up before the expiration date, you must request a new code. [employer] Access Code: [email or web address]  Expires: 2018  9:53 PM (This is the date your [employer] access code will )    4. Enter the last four digits of your Social Security Number (xxxx) and Date of Birth (mm/dd/yyyy) as indicated and click Submit.  You will be taken to the next sign-up page. 5. Create a Bot Home Automationt ID. This will be your [employer] login ID and cannot be changed, so think of one that is secure and easy to remember. 6. Create a [employer] password. You can change your password at any time. 7. Enter your Password Reset Question and Answer. This can be used at a later time if you forget your password. 8. Enter your e-mail address. You will receive e-mail notification when new information is available in [address]. 9. Click Sign Up. You can now view and download portions of your medical record. 10. Click the Download Summary menu link to download a portable copy of your medical information. Additional Information    If you have questions, please visit the Frequently Asked Questions section of the [employer] website at [email or web address] Remember, [employer] is NOT to be used for urgent needs. For medical emergencies, dial 911.

## 2018-07-02 ENCOUNTER — HOSPITAL ENCOUNTER (EMERGENCY)
Age: 53
Discharge: HOME | End: 2018-07-02
Attending: EMERGENCY MEDICINE
Payer: SUBSIDIZED

## 2018-07-02 ENCOUNTER — APPOINTMENT (OUTPATIENT)
Dept: GENERAL RADIOLOGY | Age: 53
End: 2018-07-02
Attending: PHYSICIAN ASSISTANT
Payer: SUBSIDIZED

## 2018-07-02 VITALS
OXYGEN SATURATION: 100 % | RESPIRATION RATE: 14 BRPM | SYSTOLIC BLOOD PRESSURE: 141 MMHG | HEART RATE: 55 BPM | DIASTOLIC BLOOD PRESSURE: 73 MMHG

## 2018-07-02 DIAGNOSIS — R05.9 COUGH: ICD-10-CM

## 2018-07-02 DIAGNOSIS — R06.82 TACHYPNEA: Primary | ICD-10-CM

## 2018-07-02 LAB
ALBUMIN SERPL-MCNC: 3.6 G/DL (ref 3.4–5)
ALBUMIN/GLOB SERPL: 0.9 {RATIO} (ref 0.8–1.7)
ALP SERPL-CCNC: 98 U/L (ref 45–117)
ALT SERPL-CCNC: 17 U/L (ref 13–56)
ANION GAP SERPL CALC-SCNC: 8 MMOL/L (ref 3–18)
AST SERPL-CCNC: 28 U/L (ref 15–37)
BASOPHILS # BLD: 0 K/UL (ref 0–0.1)
BASOPHILS NFR BLD: 0 % (ref 0–2)
BILIRUB SERPL-MCNC: 0.5 MG/DL (ref 0.2–1)
BNP SERPL-MCNC: 281 PG/ML (ref 0–900)
BUN SERPL-MCNC: 16 MG/DL (ref 7–18)
BUN/CREAT SERPL: 22 (ref 12–20)
CALCIUM SERPL-MCNC: 8.6 MG/DL (ref 8.5–10.1)
CHLORIDE SERPL-SCNC: 109 MMOL/L (ref 100–108)
CK MB CFR SERPL CALC: 0.9 % (ref 0–4)
CK MB SERPL-MCNC: 2 NG/ML (ref 5–25)
CK SERPL-CCNC: 235 U/L (ref 26–192)
CO2 SERPL-SCNC: 25 MMOL/L (ref 21–32)
CREAT SERPL-MCNC: 0.73 MG/DL (ref 0.6–1.3)
DIFFERENTIAL METHOD BLD: ABNORMAL
EOSINOPHIL # BLD: 0.1 K/UL (ref 0–0.4)
EOSINOPHIL NFR BLD: 2 % (ref 0–5)
ERYTHROCYTE [DISTWIDTH] IN BLOOD BY AUTOMATED COUNT: 12.5 % (ref 11.6–14.5)
GLOBULIN SER CALC-MCNC: 4.2 G/DL (ref 2–4)
GLUCOSE SERPL-MCNC: 100 MG/DL (ref 74–99)
HCT VFR BLD AUTO: 35.2 % (ref 35–45)
HGB BLD-MCNC: 12.4 G/DL (ref 12–16)
INR PPP: 1.3 (ref 0.8–1.2)
LYMPHOCYTES # BLD: 2.6 K/UL (ref 0.9–3.6)
LYMPHOCYTES NFR BLD: 41 % (ref 21–52)
MAGNESIUM SERPL-MCNC: 2.2 MG/DL (ref 1.6–2.6)
MCH RBC QN AUTO: 31.6 PG (ref 24–34)
MCHC RBC AUTO-ENTMCNC: 35.2 G/DL (ref 31–37)
MCV RBC AUTO: 89.8 FL (ref 74–97)
MONOCYTES # BLD: 0.4 K/UL (ref 0.05–1.2)
MONOCYTES NFR BLD: 7 % (ref 3–10)
NEUTS SEG # BLD: 3.1 K/UL (ref 1.8–8)
NEUTS SEG NFR BLD: 50 % (ref 40–73)
PLATELET # BLD AUTO: 315 K/UL (ref 135–420)
PMV BLD AUTO: 11.6 FL (ref 9.2–11.8)
POTASSIUM SERPL-SCNC: 3.8 MMOL/L (ref 3.5–5.5)
PROT SERPL-MCNC: 7.8 G/DL (ref 6.4–8.2)
PROTHROMBIN TIME: 15.7 SEC (ref 11.5–15.2)
RBC # BLD AUTO: 3.92 M/UL (ref 4.2–5.3)
SODIUM SERPL-SCNC: 142 MMOL/L (ref 136–145)
TROPONIN I SERPL-MCNC: <0.02 NG/ML (ref 0–0.04)
WBC # BLD AUTO: 6.2 K/UL (ref 4.6–13.2)

## 2018-07-02 PROCEDURE — 82550 ASSAY OF CK (CPK): CPT | Performed by: PHYSICIAN ASSISTANT

## 2018-07-02 PROCEDURE — 71045 X-RAY EXAM CHEST 1 VIEW: CPT

## 2018-07-02 PROCEDURE — 85025 COMPLETE CBC W/AUTO DIFF WBC: CPT | Performed by: PHYSICIAN ASSISTANT

## 2018-07-02 PROCEDURE — 83735 ASSAY OF MAGNESIUM: CPT | Performed by: PHYSICIAN ASSISTANT

## 2018-07-02 PROCEDURE — 99284 EMERGENCY DEPT VISIT MOD MDM: CPT

## 2018-07-02 PROCEDURE — 74011250637 HC RX REV CODE- 250/637: Performed by: EMERGENCY MEDICINE

## 2018-07-02 PROCEDURE — 85610 PROTHROMBIN TIME: CPT | Performed by: PHYSICIAN ASSISTANT

## 2018-07-02 PROCEDURE — 93005 ELECTROCARDIOGRAM TRACING: CPT

## 2018-07-02 PROCEDURE — 80053 COMPREHEN METABOLIC PANEL: CPT | Performed by: EMERGENCY MEDICINE

## 2018-07-02 PROCEDURE — 83880 ASSAY OF NATRIURETIC PEPTIDE: CPT | Performed by: PHYSICIAN ASSISTANT

## 2018-07-02 RX ORDER — ACETAMINOPHEN AND CODEINE PHOSPHATE 120; 12 MG/5ML; MG/5ML
5 SOLUTION ORAL
Qty: 45 ML | Refills: 0 | Status: SHIPPED | OUTPATIENT
Start: 2018-07-02 | End: 2018-09-06

## 2018-07-02 RX ORDER — LORAZEPAM 1 MG/1
1 TABLET ORAL ONCE
Status: COMPLETED | OUTPATIENT
Start: 2018-07-02 | End: 2018-07-02

## 2018-07-02 RX ADMIN — LORAZEPAM 1 MG: 1 TABLET ORAL at 17:56

## 2018-07-02 NOTE — Clinical Note
Your evaluation today showed shortness of breath. Please follow up with a doctor as discussed. The evaluation and treatment done today requires that you follow up with a physician for re-evaluation. Medical problems can change over time and symptoms ca n get worse or new symptoms can develop over time, therefore, it is important that you follow up as we discussed. Please immediately return to the ER if you have any concerns. Call the ER if you have any questions about what we discussed. At the  [facility] Emergency Department we are genuinely concerned about your health and comfort. You may be selected to participate in a patient satisfaction survey mailed to your home. We are excited about the opportunity to learn from your experi ence so we may continue to improve. In striving for the very best we believe good is not good enough, but if you rate us as EXCELLENT in all boxes, we have succeeded. We strive to provide EXCELLENT care to you and your family. We appreciate the opportuni ty to take care of you, and hope you do well.

## 2018-07-02 NOTE — ED NOTES
Pt currently sitting in bed making a coughing like noise. Will stop for 20 seconds, then start coughing again. Pt follows commands and when she is instructed to take a deep breath she follows, then proceeds back to her coughing like fit.

## 2018-07-02 NOTE — ED PROVIDER NOTES
EMERGENCY DEPARTMENT HISTORY AND PHYSICAL EXAM    5:39 PM      Date: 7/2/2018  Patient Name: Rupali Rios    History of Presenting Illness     Chief Complaint   Patient presents with    Chest Pain    Shortness of Breath         History Provided By: Patient    Chief Complaint: SOB  Duration:  Hours  Timing:  Acute  Location: Respiratory tract  Quality:   Severity: Moderate  Modifying Factors: Cough  Associated Symptoms: cough and chest pain      Additional History (Context): Rupali Rios is a 46 y.o. female with a hx of CHF who presents with complaints of SOB that started after a coughing episode around 0900. She notes an associated cough and chest pain. Her PCP is the [facility] and everything was going well on her last visit. She has no testing scheduled. She denies any recent stressful events, abdominal pain, N/V/D, and any further complaints. PCP: Briana Lerma MD    Current Outpatient Prescriptions   Medication Sig Dispense Refill    acetaminophen-codeine (TYLENOL-CODEINE) 120-12 mg/5 mL solution Take 5 mL by mouth every eight (8) hours as needed for Pain for up to 9 doses. Max Daily Amount: 15 mL. 45 mL 0    ondansetron hcl (ZOFRAN, AS HYDROCHLORIDE,) 4 mg tablet Take 2 Tabs by mouth every eight (8) hours as needed for Nausea. 12 Tab 0    diclofenac EC (VOLTAREN) 75 mg EC tablet Take 1 Tab by mouth two (2) times daily as needed. 60 Tab 2    metoprolol tartrate (LOPRESSOR) 50 mg tablet Take 1 Tab by mouth two (2) times a day. 60 Tab 5    furosemide (LASIX) 20 mg tablet Take 1 Tab by mouth daily. 30 Tab 2    ferrous sulfate 325 mg (65 mg iron) tablet Take 1 Tab by mouth two (2) times a day. 60 Tab 5    clotrimazole-betamethasone (LOTRISONE) 1-0.05 % lotion Apply  to affected area two (2) times a day. 30 mL 1    hydroCHLOROthiazide (HYDRODIURIL) 12.5 mg tablet Take 1 Tab by mouth daily.  30 Tab 5       Past History     Past Medical History:  Past Medical History:   Diagnosis Date    Arthritis Past Surgical History:  History reviewed. No pertinent surgical history. Family History:  Family History   Problem Relation Age of Onset    Cancer Mother     Hypertension Father     Hypertension Sister        Social History:  Social History   Substance Use Topics    Smoking status: Never Smoker    Smokeless tobacco: Never Used    Alcohol use No       Allergies:  No Known Allergies      Review of Systems     Review of Systems   Constitutional: Negative. HENT: Negative. Eyes: Negative. Respiratory: Positive for cough and shortness of breath. Cardiovascular: Positive for chest pain. Gastrointestinal: Negative. Endocrine: Negative. Genitourinary: Negative. Musculoskeletal: Negative. Skin: Negative. Allergic/Immunologic: Negative. Neurological: Negative. Hematological: Negative. Psychiatric/Behavioral: Negative. All other systems reviewed and are negative. Physical Exam     Patient Vitals for the past 12 hrs:   Pulse Resp BP SpO2   07/02/18 2045 (!) 55 14 141/73 100 %   07/02/18 2030 (!) 56 15 136/78 100 %   07/02/18 2000 (!) 53 17 141/78 100 %   07/02/18 1930 (!) 56 20 135/82 100 %   07/02/18 1900 (!) 56 17 143/76 100 %   07/02/18 1845 (!) 54 16 152/87 100 %   07/02/18 1830 (!) 58 15 - 100 %   07/02/18 1815 68 20 (!) 135/96 100 %   07/02/18 1800 (!) 59 15 136/81 100 %   07/02/18 1759 65 16 - 100 %   07/02/18 1745 75 25 (!) 129/105 100 %   07/02/18 1734 74 22 - 100 %       Physical Exam   Constitutional: She is oriented to person, place, and time. She appears well-developed. HENT:   Head: Normocephalic and atraumatic. Eyes: Conjunctivae and EOM are normal.   Neck: Normal range of motion. Cardiovascular: Normal heart sounds. Exam reveals no gallop and no friction rub. No murmur heard. Pulmonary/Chest: Effort normal and breath sounds normal. No stridor. Tachypnea (distractible with coaching) noted. Coughing that was distractible   Abdominal: Soft. There is no tenderness. Musculoskeletal: Normal range of motion. She exhibits no tenderness. No leg pain or swelling   Neurological: She is alert and oriented to person, place, and time. Skin: Skin is warm and dry. She is not diaphoretic. Psychiatric: She has a normal mood and affect. Her behavior is normal.   Nursing note and vitals reviewed. Diagnostic Study Results     Labs -  Recent Results (from the past 12 hour(s))   CBC WITH AUTOMATED DIFF    Collection Time: 07/02/18  5:35 PM   Result Value Ref Range    WBC 6.2 4.6 - 13.2 K/uL    RBC 3.92 (L) 4.20 - 5.30 M/uL    HGB 12.4 12.0 - 16.0 g/dL    HCT 35.2 35.0 - 45.0 %    MCV 89.8 74.0 - 97.0 FL    MCH 31.6 24.0 - 34.0 PG    MCHC 35.2 31.0 - 37.0 g/dL    RDW 12.5 11.6 - 14.5 %    PLATELET 920 396 - 792 K/uL    MPV 11.6 9.2 - 11.8 FL    NEUTROPHILS 50 40 - 73 %    LYMPHOCYTES 41 21 - 52 %    MONOCYTES 7 3 - 10 %    EOSINOPHILS 2 0 - 5 %    BASOPHILS 0 0 - 2 %    ABS. NEUTROPHILS 3.1 1.8 - 8.0 K/UL    ABS. LYMPHOCYTES 2.6 0.9 - 3.6 K/UL    ABS. MONOCYTES 0.4 0.05 - 1.2 K/UL    ABS. EOSINOPHILS 0.1 0.0 - 0.4 K/UL    ABS.  BASOPHILS 0.0 0.0 - 0.1 K/UL    DF AUTOMATED     CARDIAC PANEL,(CK, CKMB & TROPONIN)    Collection Time: 07/02/18  5:35 PM   Result Value Ref Range     (H) 26 - 192 U/L    CK - MB 2.0 <3.6 ng/ml    CK-MB Index 0.9 0.0 - 4.0 %    Troponin-I, Qt. <0.02 0.0 - 0.045 NG/ML   PROTHROMBIN TIME + INR    Collection Time: 07/02/18  5:35 PM   Result Value Ref Range    Prothrombin time 15.7 (H) 11.5 - 15.2 sec    INR 1.3 (H) 0.8 - 1.2     MAGNESIUM    Collection Time: 07/02/18  5:35 PM   Result Value Ref Range    Magnesium 2.2 1.6 - 2.6 mg/dL   NT-PRO BNP    Collection Time: 07/02/18  5:35 PM   Result Value Ref Range    NT pro- 0 - 358 PG/ML   METABOLIC PANEL, COMPREHENSIVE    Collection Time: 07/02/18  5:35 PM   Result Value Ref Range    Sodium 142 136 - 145 mmol/L    Potassium 3.8 3.5 - 5.5 mmol/L    Chloride 109 (H) 100 - 108 mmol/L    CO2 25 21 - 32 mmol/L    Anion gap 8 3.0 - 18 mmol/L    Glucose 100 (H) 74 - 99 mg/dL    BUN 16 7.0 - 18 MG/DL    Creatinine 0.73 0.6 - 1.3 MG/DL    BUN/Creatinine ratio 22 (H) 12 - 20      GFR est AA >60 >60 ml/min/1.73m2    GFR est non-AA >60 >60 ml/min/1.73m2    Calcium 8.6 8.5 - 10.1 MG/DL    Bilirubin, total 0.5 0.2 - 1.0 MG/DL    ALT (SGPT) 17 13 - 56 U/L    AST (SGOT) 28 15 - 37 U/L    Alk. phosphatase 98 45 - 117 U/L    Protein, total 7.8 6.4 - 8.2 g/dL    Albumin 3.6 3.4 - 5.0 g/dL    Globulin 4.2 (H) 2.0 - 4.0 g/dL    A-G Ratio 0.9 0.8 - 1.7     EKG, 12 LEAD, INITIAL    Collection Time: 07/02/18  5:40 PM   Result Value Ref Range    Ventricular Rate 59 BPM    Atrial Rate 59 BPM    P-R Interval 160 ms    QRS Duration 78 ms    Q-T Interval 402 ms    QTC Calculation (Bezet) 397 ms    Calculated P Axis 20 degrees    Calculated R Axis 40 degrees    Calculated T Axis 18 degrees    Diagnosis       Sinus bradycardia  Nonspecific T wave abnormality  Abnormal ECG  When compared with ECG of 29-MAR-2018 20:17,  QT has shortened         Radiologic Studies -   XR CHEST PORT   Final Result   IMPRESSION[de-identified]    1.  No acute cardiopulmonary disease. Medical Decision Making     Provider Notes (Medical Decision Making): Pt with hx of diastolic HF but here euvolemic and did not appear overloaded. She did have episodes of tachypnea and coughing that was distractible which resolved after PO ativan as well. She currently could have an cardiopulm cause for her sx but her current testing including EKG, trop, BNP are reassuring today. I explained to her and her Jew friend the findings, and she is agreeable for outpt PMD and cards follow up urgently. -They verbally convey understanding and agreement of the signs, symptoms, diagnosis, treatment and prognosis and additionally agree to follow up as discussed.  They understand that there are limitations to any evaluation, and that should the symptoms worsen or change that they need to return for further evaluation. All questions were answered, and we reviewed pertinent return precautions as seen in the discharge paperwork. Pt understood follow up instructions, and would return to the ED if any worsening or concerns. I am the first provider for this patient. I reviewed the vital signs, available nursing notes, past medical history, past surgical history, family history and social history. Vital Signs-Reviewed the patient's vital signs. Pulse Oximetry Analysis -  100% on room air (Interpretation)WNL    Cardiac Monitor:  Rate: 75 BPM  Rhythm:  Normal Sinus Rhythm     EKG: Interpreted by the EP. Time Interpreted:1740   Rate: 59 BPM   Rhythm: Sinus Bradycardia    Interpretation:Nonspecific ST abnormalities, QTc of 397   Comparison:     Records Reviewed: Nursing Notes and Old Medical Records (Time of Review: 5:39 PM)    ED Course: Progress Notes, Reevaluation, and Consults:  8:40 PM Pt reevaluated at this time and is resting comfortably in NAD. Discussed results and findings, as well as, diagnosis and plan for discharge. Pt verbalizes understanding and agreement with plan. All questions addressed at this time. The patient was discharged with the following instructions: Your evaluation today showed shortness of breath. Please follow up with a doctor as discussed. The evaluation and treatment done today requires that you follow up with a physician for re-evaluation. Medical problems can change over time and symptoms can get worse or new symptoms can develop over time, therefore, it is important that you follow up as we discussed. Please immediately return to the ER if you have any concerns. Call the ER if you have any questions about what we discussed. Diagnosis     Clinical Impression:   1. Tachypnea    2.  Cough        Disposition: Discharge    Follow-up Information     Follow up With Details Comments Contact Info    Your Doctor In 1 day      Lai ANDRADE Debi Thomas MD In 2 days For cardiology follow up [address] [place] [id number]  [phone]      [facility] EMERGENCY DEPT  If symptoms worsen [address] [address]  [phone]           Discharge Medication List as of 7/2/2018  8:53 PM      START taking these medications    Details   acetaminophen-codeine (TYLENOL-CODEINE) 120-12 mg/5 mL solution Take 5 mL by mouth every eight (8) hours as needed for Pain for up to 9 doses. Max Daily Amount: 15 mL. , Print, Disp-45 mL, R-0         CONTINUE these medications which have NOT CHANGED    Details   ondansetron hcl (ZOFRAN, AS HYDROCHLORIDE,) 4 mg tablet Take 2 Tabs by mouth every eight (8) hours as needed for Nausea. , Print, Disp-12 Tab, R-0      diclofenac EC (VOLTAREN) 75 mg EC tablet Take 1 Tab by mouth two (2) times daily as needed., Normal, Disp-60 Tab, R-2      metoprolol tartrate (LOPRESSOR) 50 mg tablet Take 1 Tab by mouth two (2) times a day., Print, Disp-60 Tab, R-5      furosemide (LASIX) 20 mg tablet Take 1 Tab by mouth daily. , Print, Disp-30 Tab, R-2      ferrous sulfate 325 mg (65 mg iron) tablet Take 1 Tab by mouth two (2) times a day., Print, Disp-60 Tab, R-5      clotrimazole-betamethasone (LOTRISONE) 1-0.05 % lotion Apply  to affected area two (2) times a day., No Print, Disp-30 mL, R-1      hydroCHLOROthiazide (HYDRODIURIL) 12.5 mg tablet Take 1 Tab by mouth daily. , Print, Disp-30 Tab, R-5           _______________________________    Attestations:  Nancy [address] [place] [address] acting as a scribe for and in the presence of Preet Jackson MD      July 02, 2018 at 10:05 PM       Provider Attestation:      I personally performed the services described in the documentation, reviewed the documentation, as recorded by the scribe in my presence, and it accurately and completely records my words and actions.  July 02, 2018 at 10:05 PM - Preet Jackson MD    _______________________________

## 2018-07-03 LAB
ATRIAL RATE: 59 BPM
CALCULATED P AXIS, ECG09: 20 DEGREES
CALCULATED R AXIS, ECG10: 40 DEGREES
CALCULATED T AXIS, ECG11: 18 DEGREES
DIAGNOSIS, 93000: NORMAL
P-R INTERVAL, ECG05: 160 MS
Q-T INTERVAL, ECG07: 402 MS
QRS DURATION, ECG06: 78 MS
QTC CALCULATION (BEZET), ECG08: 397 MS
VENTRICULAR RATE, ECG03: 59 BPM

## 2018-07-03 NOTE — DISCHARGE INSTRUCTIONS

## 2018-09-06 ENCOUNTER — OFFICE VISIT (OUTPATIENT)
Dept: FAMILY MEDICINE CLINIC | Age: 53
End: 2018-09-06

## 2018-09-06 VITALS
DIASTOLIC BLOOD PRESSURE: 86 MMHG | OXYGEN SATURATION: 97 % | HEIGHT: 64 IN | TEMPERATURE: 97.3 F | WEIGHT: 255 LBS | HEART RATE: 50 BPM | SYSTOLIC BLOOD PRESSURE: 129 MMHG | RESPIRATION RATE: 14 BRPM | BODY MASS INDEX: 43.54 KG/M2

## 2018-09-06 DIAGNOSIS — M25.572 BILATERAL ANKLE PAIN, UNSPECIFIED CHRONICITY: Primary | ICD-10-CM

## 2018-09-06 DIAGNOSIS — R60.0 LOCALIZED EDEMA: ICD-10-CM

## 2018-09-06 DIAGNOSIS — M25.571 BILATERAL ANKLE PAIN, UNSPECIFIED CHRONICITY: Primary | ICD-10-CM

## 2018-09-06 PROBLEM — E66.01 OBESITY, MORBID (HCC): Status: ACTIVE | Noted: 2018-09-06

## 2018-09-06 NOTE — PROGRESS NOTES
HPI  Rajni Joshi is a 46 y.o. female being seen today for   Chief Complaint   Patient presents with    Medication Refill    Ankle swelling     \"pt stated that left ankle been swelling for about over 2 weeks\"   follow up for this pt not sen in 11 months. she states that she was out of her lasix and she just restarted it. Her ankles were very swollen but have started to get some better. Does hve chronic bilateral ankle pain. She has lost 50# with diet changes. Past Medical History:   Diagnosis Date    Arthritis          ROS  Patient states that she is feeling well. Denies complaints of chest pain, shortness of breath,, dizziness or weakness. she denies nausea, vomiting or diarrhea. Current Outpatient Prescriptions   Medication Sig    diclofenac EC (VOLTAREN) 75 mg EC tablet Take 1 Tab by mouth two (2) times daily as needed.  metoprolol tartrate (LOPRESSOR) 50 mg tablet Take 1 Tab by mouth two (2) times a day.  furosemide (LASIX) 20 mg tablet Take 1 Tab by mouth daily.  ferrous sulfate 325 mg (65 mg iron) tablet Take 1 Tab by mouth two (2) times a day.  clotrimazole-betamethasone (LOTRISONE) 1-0.05 % lotion Apply  to affected area two (2) times a day.  hydroCHLOROthiazide (HYDRODIURIL) 12.5 mg tablet Take 1 Tab by mouth daily. No current facility-administered medications for this visit. PE  Visit Vitals    /86 (BP 1 Location: Left arm, BP Patient Position: Sitting)    Pulse (!) 50    Temp 97.3 °F (36.3 °C) (Temporal)    Resp 14    Ht 5' 4\" (1.626 m)    Wt 255 lb (115.7 kg)    LMP 08/28/2018    SpO2 97%    BMI 43.77 kg/m2        Alert and oriented with normal mood and affect. she is well developed and well nourished . Lungs are clear without wheezing. Heart rate is regular without murmurs or gallops. There is 1+ lower extremity edema. As well as fallen arches and valgus deformity of ankles. Assessment and Plan:        ICD-10-CM ICD-9-CM    1. Bilateral ankle pain, unspecified chronicity M25.571 719.47 XR ANKLE LT MIN 3 V    M25.572  REFERRAL TO ORTHOPEDICS     Continue diuretics  Referral ortho for chronic ankle and knee pain      Ramu Black MD

## 2018-09-07 ENCOUNTER — HOSPITAL ENCOUNTER (OUTPATIENT)
Dept: GENERAL RADIOLOGY | Age: 53
Discharge: HOME | End: 2018-09-07
Payer: SUBSIDIZED

## 2018-09-07 DIAGNOSIS — M25.572 BILATERAL ANKLE PAIN, UNSPECIFIED CHRONICITY: ICD-10-CM

## 2018-09-07 DIAGNOSIS — M25.571 BILATERAL ANKLE PAIN, UNSPECIFIED CHRONICITY: ICD-10-CM

## 2018-09-07 PROCEDURE — 73610 X-RAY EXAM OF ANKLE: CPT

## 2018-09-09 RX ORDER — HYDROCHLOROTHIAZIDE 12.5 MG/1
12.5 TABLET ORAL DAILY
Qty: 30 TAB | Refills: 5
Start: 2018-09-09

## 2018-09-20 ENCOUNTER — HOSPITAL ENCOUNTER (OUTPATIENT)
Dept: LAB | Age: 53
Discharge: HOME | End: 2018-09-20

## 2018-09-20 ENCOUNTER — OFFICE VISIT (OUTPATIENT)
Dept: FAMILY MEDICINE CLINIC | Age: 53
End: 2018-09-20

## 2018-09-20 VITALS
BODY MASS INDEX: 42.68 KG/M2 | RESPIRATION RATE: 14 BRPM | TEMPERATURE: 96.3 F | SYSTOLIC BLOOD PRESSURE: 137 MMHG | DIASTOLIC BLOOD PRESSURE: 82 MMHG | WEIGHT: 250 LBS | OXYGEN SATURATION: 99 % | HEART RATE: 48 BPM | HEIGHT: 64 IN

## 2018-09-20 DIAGNOSIS — M25.572 BILATERAL ANKLE PAIN, UNSPECIFIED CHRONICITY: ICD-10-CM

## 2018-09-20 DIAGNOSIS — M25.571 BILATERAL ANKLE PAIN, UNSPECIFIED CHRONICITY: ICD-10-CM

## 2018-09-20 DIAGNOSIS — M17.11 PRIMARY OSTEOARTHRITIS OF RIGHT KNEE: ICD-10-CM

## 2018-09-20 DIAGNOSIS — R60.0 LOCALIZED EDEMA: Primary | ICD-10-CM

## 2018-09-20 LAB
ALBUMIN SERPL-MCNC: 3.8 G/DL (ref 3.4–5)
ALBUMIN/GLOB SERPL: 0.9 {RATIO} (ref 0.8–1.7)
ALP SERPL-CCNC: 122 U/L (ref 45–117)
ALT SERPL-CCNC: 16 U/L (ref 13–56)
ANION GAP SERPL CALC-SCNC: 7 MMOL/L (ref 3–18)
AST SERPL-CCNC: 17 U/L (ref 15–37)
BASOPHILS # BLD: 0 K/UL (ref 0–0.1)
BASOPHILS NFR BLD: 1 % (ref 0–2)
BILIRUB SERPL-MCNC: 0.4 MG/DL (ref 0.2–1)
BUN SERPL-MCNC: 17 MG/DL (ref 7–18)
BUN/CREAT SERPL: 20 (ref 12–20)
CALCIUM SERPL-MCNC: 8.7 MG/DL (ref 8.5–10.1)
CHLORIDE SERPL-SCNC: 105 MMOL/L (ref 100–108)
CO2 SERPL-SCNC: 28 MMOL/L (ref 21–32)
CREAT SERPL-MCNC: 0.87 MG/DL (ref 0.6–1.3)
DIFFERENTIAL METHOD BLD: ABNORMAL
EOSINOPHIL # BLD: 0.2 K/UL (ref 0–0.4)
EOSINOPHIL NFR BLD: 2 % (ref 0–5)
ERYTHROCYTE [DISTWIDTH] IN BLOOD BY AUTOMATED COUNT: 12.8 % (ref 11.6–14.5)
GLOBULIN SER CALC-MCNC: 4.2 G/DL (ref 2–4)
GLUCOSE SERPL-MCNC: 89 MG/DL (ref 74–99)
HCT VFR BLD AUTO: 37.7 % (ref 35–45)
HGB BLD-MCNC: 12.1 G/DL (ref 12–16)
LYMPHOCYTES # BLD: 3 K/UL (ref 0.9–3.6)
LYMPHOCYTES NFR BLD: 40 % (ref 21–52)
MCH RBC QN AUTO: 31.2 PG (ref 24–34)
MCHC RBC AUTO-ENTMCNC: 32.1 G/DL (ref 31–37)
MCV RBC AUTO: 97.2 FL (ref 74–97)
MONOCYTES # BLD: 0.5 K/UL (ref 0.05–1.2)
MONOCYTES NFR BLD: 7 % (ref 3–10)
NEUTS SEG # BLD: 3.8 K/UL (ref 1.8–8)
NEUTS SEG NFR BLD: 50 % (ref 40–73)
PLATELET # BLD AUTO: 353 K/UL (ref 135–420)
PMV BLD AUTO: 12.3 FL (ref 9.2–11.8)
POTASSIUM SERPL-SCNC: 4.2 MMOL/L (ref 3.5–5.5)
PROT SERPL-MCNC: 8 G/DL (ref 6.4–8.2)
RBC # BLD AUTO: 3.88 M/UL (ref 4.2–5.3)
SODIUM SERPL-SCNC: 140 MMOL/L (ref 136–145)
WBC # BLD AUTO: 7.5 K/UL (ref 4.6–13.2)

## 2018-09-20 PROCEDURE — 85025 COMPLETE CBC W/AUTO DIFF WBC: CPT | Performed by: FAMILY MEDICINE

## 2018-09-20 PROCEDURE — 80053 COMPREHEN METABOLIC PANEL: CPT | Performed by: FAMILY MEDICINE

## 2018-09-20 NOTE — PROGRESS NOTES
HPI  Beryle Rohrer is a 46 y.o. female being seen today for   Chief Complaint   Patient presents with    Follow-up   .  she states that she was using the lasix daily but she got cramps so she cut back to prn. Swelling is better. Ankle xray shows OA. Past Medical History:   Diagnosis Date    Arthritis          ROS  Patient states that she is feeling well. Denies complaints of chest pain, shortness of breath, swelling of legs, dizziness or weakness. she denies nausea, vomiting or diarrhea. Current Outpatient Prescriptions   Medication Sig    clotrimazole-betamethasone (LOTRISONE) 1-0.05 % lotion Apply  to affected area two (2) times a day.  hydroCHLOROthiazide (HYDRODIURIL) 12.5 mg tablet Take 1 Tab by mouth daily.  diclofenac EC (VOLTAREN) 75 mg EC tablet Take 1 Tab by mouth two (2) times daily as needed.  metoprolol tartrate (LOPRESSOR) 50 mg tablet Take 1 Tab by mouth two (2) times a day.  furosemide (LASIX) 20 mg tablet Take 1 Tab by mouth daily.  ferrous sulfate 325 mg (65 mg iron) tablet Take 1 Tab by mouth two (2) times a day. No current facility-administered medications for this visit. PE  Visit Vitals    /82 (BP 1 Location: Left arm, BP Patient Position: Sitting)    Pulse (!) 48    Temp 96.3 °F (35.7 °C) (Temporal)    Resp 14    Ht 5' 4\" (1.626 m)    Wt 250 lb (113.4 kg)    LMP 08/28/2018    SpO2 99%    BMI 42.91 kg/m2        Alert and oriented with normal mood and affect. she is well developed and well nourished . Lungs are clear without wheezing. Heart rate is regular without murmurs or gallops. There is no lower extremity edema. Assessment and Plan:        ICD-10-CM ICD-9-CM    1. Localized edema R60.0 782.3    2. Bilateral ankle pain, unspecified chronicity M25.571 719.47     M25.572     3.  Primary osteoarthritis of right knee M17.11 715.16      Labs today  follouwp ortho  Continue wt loss      Ruth Staples MD

## 2018-09-21 RX ORDER — CLOTRIMAZOLE AND BETAMETHASONE DIPROPIONATE 10; .5 MG/ML; MG/ML
LOTION TOPICAL 2 TIMES DAILY
Qty: 30 ML | Refills: 1 | Status: SHIPPED | OUTPATIENT
Start: 2018-09-21 | End: 2018-12-06 | Stop reason: SDUPTHER

## 2018-09-24 ENCOUNTER — OFFICE VISIT (OUTPATIENT)
Dept: ORTHOPEDIC SURGERY | Age: 53
End: 2018-09-24

## 2018-09-24 VITALS
DIASTOLIC BLOOD PRESSURE: 84 MMHG | TEMPERATURE: 98.2 F | HEART RATE: 66 BPM | SYSTOLIC BLOOD PRESSURE: 128 MMHG | HEIGHT: 64 IN | WEIGHT: 254.6 LBS | BODY MASS INDEX: 43.46 KG/M2

## 2018-09-24 DIAGNOSIS — M19.072 PRIMARY OSTEOARTHRITIS OF LEFT ANKLE: ICD-10-CM

## 2018-09-24 DIAGNOSIS — M77.52 LEFT ANKLE TENDINITIS: Primary | ICD-10-CM

## 2018-09-24 RX ORDER — DICLOFENAC SODIUM 75 MG/1
75 TABLET, DELAYED RELEASE ORAL
Qty: 60 TAB | Refills: 2 | Status: SHIPPED | OUTPATIENT
Start: 2018-09-24

## 2018-09-24 NOTE — PATIENT INSTRUCTIONS
[email or web address]    Search YouTube for my channel:    Dr. Darshan Koenig ([facility]): Exercises  Your Care Instructions  Here are some examples of typical rehabilitation exercises for your condition. Start each exercise slowly. Ease off the exercise if you start to have pain. Your doctor or physical therapist will tell you when you can start these exercises and which ones will work best for you. How to do the exercises  Calf wall stretch (back knee straight)    1. Stand facing a wall with your hands on the wall at about eye level. Put your affected leg about a step behind your other leg. 2. Keeping your back leg straight and your back heel on the floor, bend your front knee and gently bring your hip and chest toward the wall until you feel a stretch in the calf of your back leg. 3. Hold the stretch for at least 15 to 30 seconds. 4. Repeat 2 to 4 times. Calf wall stretch (knees bent)    1. Stand facing a wall with your hands on the wall at about eye level. Put your affected leg about a step behind your other leg. 2. Keeping both heels on the floor, bend both knees. Then gently bring your hip and chest toward the wall until you feel a stretch in the calf of your back leg. 3. Hold the stretch for at least 15 to 30 seconds. 4. Repeat 2 to 4 times. Hamstring wall stretch    1. Lie on your back in a doorway, with your good leg through the open door. 2. Slide your affected leg up the wall to straighten your knee. You should feel a gentle stretch down the back of your leg. 1. Do not arch your back. 2. Do not bend either knee. 3. Keep one heel touching the floor and the other heel touching the wall. Do not point your toes. 3. Hold the stretch for at least 1 minute to begin. Then over time, try to lengthen the time you hold the stretch to as long as 6 minutes. 4. Repeat 2 to 4 times.   5. If you do not have a place to do this exercise in a doorway, there is another way to do it:  6. Lie on your back, and bend the knee of your affected leg. 7. Loop a towel under the ball and toes of that foot, and hold the ends of the towel in your hands. 8. Straighten your knee, and slowly pull back on the towel. You should feel a gentle stretch down the back of your leg. 9. Hold the stretch for 15 to 30 seconds. Or even better, hold the stretch for 1 minute if you can. 10. Repeat 2 to 4 times. Shin muscle stretch    1. Sit in a chair, with both feet flat on the floor. 2. Bend your affected leg behind you so that the top of your foot near your toes is flat on the floor and your toes are pointed away from your body. If you need to, you can hold on to the sides of the chair for support. 3. Hold the stretch for at least 15 to 30 seconds. You should feel a stretch in the front (shin) of your lower leg. 4. Repeat 2 to 4 times. Follow-up care is a key part of your treatment and safety. Be sure to make and go to all appointments, and call your doctor if you are having problems. It's also a good idea to know your test results and keep a list of the medicines you take. Where can you learn more? Go to http://tennille-anaid.info/. Enter M378 in the search box to learn more about \"Shin Splints (Shin Pain): Exercises. \"  Current as of: November 29, 2017  Content Version: 11.7  © 6075-8674 [employer]. Care instructions adapted under license by [employer] (which disclaims liability or warranty for this information). If you have questions about a medical condition or this instruction, always ask your healthcare professional. [address] any warranty or liability for your use of this information.

## 2018-09-24 NOTE — PROGRESS NOTES
HISTORY OF PRESENT Leilani Cruz is a 46y.o. year old female comes in today to be evaluated and treated at the request of WILSON Rm MD for: left ankle pain    Patients symptoms have been present for several years. Pain level 10/10 last couple months w/ burning and swelling, It has improved with diclofenac 75mg BID from PCP. IMAGING: XR 9/7/18  IMPRESSION:   Multifocal chronic osteoarthritic changes, no acute or aggressive osseous  lesions. Social History     Social History    Marital status: SINGLE     Spouse name: N/A    Number of children: N/A    Years of education: N/A     Social History Main Topics    Smoking status: Never Smoker    Smokeless tobacco: Never Used    Alcohol use No    Drug use: No    Sexual activity: Not Asked     Other Topics Concern    None     Social History Narrative     Current Outpatient Prescriptions   Medication Sig Dispense Refill    clotrimazole-betamethasone (LOTRISONE) 1-0.05 % lotion Apply  to affected area two (2) times a day. 30 mL 1    hydroCHLOROthiazide (HYDRODIURIL) 12.5 mg tablet Take 1 Tab by mouth daily. 30 Tab 5    diclofenac EC (VOLTAREN) 75 mg EC tablet Take 1 Tab by mouth two (2) times daily as needed. 60 Tab 2    furosemide (LASIX) 20 mg tablet Take 1 Tab by mouth daily. 30 Tab 2    metoprolol tartrate (LOPRESSOR) 50 mg tablet Take 1 Tab by mouth two (2) times a day. 60 Tab 5    ferrous sulfate 325 mg (65 mg iron) tablet Take 1 Tab by mouth two (2) times a day. 61 Tab 5     Past Medical History:   Diagnosis Date    Arthritis      Family History   Problem Relation Age of Onset    Cancer Mother     Hypertension Father     Hypertension Sister          ROS:  Some swell. No numb. See HPI. Objective:  /84  Pulse 66  Temp 98.2 °F (36.8 °C)  Ht 5' 4\" (1.626 m)  Wt 254 lb 9.6 oz (115.5 kg)  LMP 08/28/2018  BMI 43.7 kg/m2  GEN: Appears stated age in NAD. HEAD:  Normocephalic, atraumatic.   NEURO:  Sensation intact light touch B/L lower extremities. MS:  Strength normal throughout upper and lower extremities B/L.   left ankle/foot:  Positive tenderness at peroneus longus/brevis. Negative for tenderness at malleoli, base 5th. Anterior drawer test negative. Talar tilt negative. Kleiger test negative. Syndesmosis squeeze negative. negative fibular head tenderness. Fibular head motion normal. Gait normal.  no clubbing/cyanosis. ROM normal.  EXT: no clubbing/cyanosis. no edema. SKIN: Warm/dry without rash. Assessment/Plan:     ICD-10-CM ICD-9-CM    1. Left ankle tendinitis M77.52 727.06 diclofenac EC (VOLTAREN) 75 mg EC tablet   2. Primary osteoarthritis of left ankle M19.072 715.17 diclofenac EC (VOLTAREN) 75 mg EC tablet       Patient (or guardian if minor) verbalizes understanding of evaluation and plan. Will Rx voltaren as benefit and stretch as demo and RTC 6 weeks.

## 2018-09-24 NOTE — MR AVS SNAPSHOT
[address], [address] Formerly [facility] 83 [id number] 
[phone] Patient: Mee Mckeon MRN: [id number] :1965 Visit Information Date & Time Provider Department Dept. Phone Encounter #  
 2018 10:40 AM Boris Mercer, [facility] - [facility] [phone] [id number] Follow-up Instructions Return in about 6 weeks (around 2018) for left ankle tendinitis/OA. Routing History Follow-up and Disposition History Upcoming Health Maintenance Date Due Hepatitis C Screening 1965 DTaP/Tdap/Td series (1 - Tdap) 1985 FOBT Q 1 YEAR AGE 50-75 2014 Influenza Age 5 to Adult 2018 BREAST CANCER SCRN MAMMOGRAM 2020 PAP AKA CERVICAL CYTOLOGY 2021 Allergies as of 2018  Review Complete On: 2018 By: Boris Mercer DO No Known Allergies Current Immunizations  Never Reviewed No immunizations on file. Not reviewed this visit You Were Diagnosed With   
  
 Codes Comments Left ankle tendinitis    -  Primary ICD-10-CM: M77.52 
ICD-9-CM: 727.06   
 Primary osteoarthritis of left ankle     ICD-10-CM: M19.072 ICD-9-CM: 715.17 Vitals BP Pulse Temp Height(growth percentile) Weight(growth percentile) LMP  
 128/84 66 98.2 °F (36.8 °C) 5' 4\" (1.626 m) 254 lb 9.6 oz (115.5 kg) 2018 BMI OB Status Smoking Status 43.7 kg/m2 Having regular periods Never Smoker Vitals History BMI and BSA Data Body Mass Index Body Surface Area 43.7 kg/m 2 2.28 m 2 Preferred Pharmacy Pharmacy Name Phone [facility], [address] [place] [address] [phone] Your Updated Medication List  
  
   
This list is accurate as of 18 11:08 AM.  Always use your most recent med list.  
  
  
  
  
 clotrimazole-betamethasone 1-0.05 % lotion Commonly known as:  Klai Gonzalez  
 Apply  to affected area two (2) times a day. diclofenac EC 75 mg EC tablet Commonly known as:  VOLTAREN Take 1 Tab by mouth two (2) times daily as needed. ferrous sulfate 325 mg (65 mg iron) tablet Take 1 Tab by mouth two (2) times a day. furosemide 20 mg tablet Commonly known as:  LASIX Take 1 Tab by mouth daily. hydroCHLOROthiazide 12.5 mg tablet Commonly known as:  HYDRODIURIL Take 1 Tab by mouth daily. metoprolol tartrate 50 mg tablet Commonly known as:  LOPRESSOR Take 1 Tab by mouth two (2) times a day. Prescriptions Printed Refills  
 diclofenac EC (VOLTAREN) 75 mg EC tablet 2 Sig: Take 1 Tab by mouth two (2) times daily as needed. Class: Print Route: Oral  
  
Follow-up Instructions Return in about 6 weeks (around 11/5/2018) for left ankle tendinitis/OA. Patient Instructions   
[email or web address] Search YouTube for my channel: 
 
Dr. Woo Cabrera Wooten Splints Shin Splints (Shin Pain): Exercises Your Care Instructions Here are some examples of typical rehabilitation exercises for your condition. Start each exercise slowly. Ease off the exercise if you start to have pain. Your doctor or physical therapist will tell you when you can start these exercises and which ones will work best for you. How to do the exercises Calf wall stretch (back knee straight) 1. Stand facing a wall with your hands on the wall at about eye level. Put your affected leg about a step behind your other leg. 2. Keeping your back leg straight and your back heel on the floor, bend your front knee and gently bring your hip and chest toward the wall until you feel a stretch in the calf of your back leg. 3. Hold the stretch for at least 15 to 30 seconds. 4. Repeat 2 to 4 times. Calf wall stretch (knees bent) 1. Stand facing a wall with your hands on the wall at about eye level.  Put your affected leg about a step behind your other leg. 2. Keeping both heels on the floor, bend both knees. Then gently bring your hip and chest toward the wall until you feel a stretch in the calf of your back leg. 3. Hold the stretch for at least 15 to 30 seconds. 4. Repeat 2 to 4 times. Hamstring wall stretch 1. Lie on your back in a doorway, with your good leg through the open door. 2. Slide your affected leg up the wall to straighten your knee. You should feel a gentle stretch down the back of your leg. 1. Do not arch your back. 2. Do not bend either knee. 3. Keep one heel touching the floor and the other heel touching the wall. Do not point your toes. 3. Hold the stretch for at least 1 minute to begin. Then over time, try to lengthen the time you hold the stretch to as long as 6 minutes. 4. Repeat 2 to 4 times. 5. If you do not have a place to do this exercise in a doorway, there is another way to do it: 6. Lie on your back, and bend the knee of your affected leg. 7. Loop a towel under the ball and toes of that foot, and hold the ends of the towel in your hands. 8. Straighten your knee, and slowly pull back on the towel. You should feel a gentle stretch down the back of your leg. 9. Hold the stretch for 15 to 30 seconds. Or even better, hold the stretch for 1 minute if you can. 10. Repeat 2 to 4 times. Shin muscle stretch 1. Sit in a chair, with both feet flat on the floor. 2. Bend your affected leg behind you so that the top of your foot near your toes is flat on the floor and your toes are pointed away from your body. If you need to, you can hold on to the sides of the chair for support. 3. Hold the stretch for at least 15 to 30 seconds. You should feel a stretch in the front (shin) of your lower leg. 4. Repeat 2 to 4 times. Follow-up care is a key part of your treatment and safety.  Be sure to make and go to all appointments, and call your doctor if you are having problems. It's also a good idea to know your test results and keep a list of the medicines you take. Where can you learn more? Go to http://tennille-anaid.info/. Enter T920 in the search box to learn more about \"Shin Splints (Shin Pain): Exercises. \" Current as of: November 29, 2017 Content Version: 11.7 © 8315-5564 [employer]. Care instructions adapted under license by [employer] (which disclaims liability or warranty for this information). If you have questions about a medical condition or this instruction, always ask your healthcare professional. [place] [address] any warranty or liability for your use of this information. Introducing [facility]! [employer] introduces [employer] patient portal. Now you can access parts of your medical record, email your doctor's office, and request medication refills online. 1. In your internet browser, go to [email or web address] 2. Click on the First Time User? Click Here link in the Sign In box. You will see the New Member Sign Up page. 3. Enter your [employer] Access Code exactly as it appears below. You will not need to use this code after youve completed the sign-up process. If you do not sign up before the expiration date, you must request a new code. · [employer] Access Code: [email or web address] Expires: 9/30/2018  5:47 PM 
 
4. Enter the last four digits of your Social Security Number (xxxx) and Date of Birth (mm/dd/yyyy) as indicated and click Submit. You will be taken to the next sign-up page. 5. Create a [employer] ID. This will be your [employer] login ID and cannot be changed, so think of one that is secure and easy to remember. 6. Create a [employer] password. You can change your password at any time. 7. Enter your Password Reset Question and Answer. This can be used at a later time if you forget your password. 8. Enter your e-mail address. You will receive e-mail notification when new information is available in [address] 9. Click Sign Up. You can now view and download portions of your medical record. 10. Click the Download Summary menu link to download a portable copy of your medical information. If you have questions, please visit the Frequently Asked Questions section of the [employer] website. Remember, [employer] is NOT to be used for urgent needs. For medical emergencies, dial 911. Now available from your iPhone and Android! Please provide this summary of care documentation to your next provider. Your primary care clinician is listed as Phys Other. If you have any questions after today's visit, please call [phone].

## 2018-11-05 ENCOUNTER — OFFICE VISIT (OUTPATIENT)
Dept: ORTHOPEDIC SURGERY | Age: 53
End: 2018-11-05

## 2018-11-05 VITALS
HEART RATE: 61 BPM | BODY MASS INDEX: 43.54 KG/M2 | RESPIRATION RATE: 15 BRPM | WEIGHT: 255 LBS | HEIGHT: 64 IN | TEMPERATURE: 98.4 F | DIASTOLIC BLOOD PRESSURE: 98 MMHG | SYSTOLIC BLOOD PRESSURE: 155 MMHG

## 2018-11-05 DIAGNOSIS — M77.52 LEFT ANKLE TENDINITIS: Primary | ICD-10-CM

## 2018-11-05 DIAGNOSIS — M19.072: ICD-10-CM

## 2018-11-05 RX ORDER — DICLOFENAC SODIUM 10 MG/G
2 GEL TOPICAL 4 TIMES DAILY
Qty: 200 G | Refills: 1 | Status: SHIPPED | OUTPATIENT
Start: 2018-11-05 | End: 2018-12-06 | Stop reason: SDUPTHER

## 2018-11-05 NOTE — PROGRESS NOTES
HISTORY OF PRESENT Mario Fish is a 46y.o. year old female comes in today to be evaluated and treated for follow-up: left ankle    Since last appt has noticed improvement with HEp and diclofenac oral. Pain level 5/10. Still has trouble walking and would like help with HRT verification for disability from them  Also would like to try voltaren gel    IMAGING: XR 9/7/18  IMPRESSION:   Multifocal chronic osteoarthritic changes, no acute or aggressive osseous  lesions. Social History     Socioeconomic History    Marital status: SINGLE     Spouse name: Not on file    Number of children: Not on file    Years of education: Not on file    Highest education level: Not on file   Social Needs    Financial resource strain: Not on file    Food insecurity - worry: Not on file    Food insecurity - inability: Not on file    Transportation needs - medical: Not on file   [place] needs - non-medical: Not on file   Occupational History    Not on file   Tobacco Use    Smoking status: Never Smoker    Smokeless tobacco: Never Used   Substance and Sexual Activity    Alcohol use: No    Drug use: No    Sexual activity: Not on file   Other Topics Concern    Not on file   Social History Narrative    Not on file     Current Outpatient Medications   Medication Sig Dispense Refill    diclofenac EC (VOLTAREN) 75 mg EC tablet Take 1 Tab by mouth two (2) times daily as needed. 60 Tab 2    hydroCHLOROthiazide (HYDRODIURIL) 12.5 mg tablet Take 1 Tab by mouth daily. 30 Tab 5    metoprolol tartrate (LOPRESSOR) 50 mg tablet Take 1 Tab by mouth two (2) times a day. 60 Tab 5    furosemide (LASIX) 20 mg tablet Take 1 Tab by mouth daily. 30 Tab 2    clotrimazole-betamethasone (LOTRISONE) 1-0.05 % lotion Apply  to affected area two (2) times a day. 30 mL 1    ferrous sulfate 325 mg (65 mg iron) tablet Take 1 Tab by mouth two (2) times a day.  60 Tab 5     Past Medical History:   Diagnosis Date    Arthritis      Family History   Problem Relation Age of Onset    Cancer Mother     Hypertension Father     Hypertension Sister          ROS:  Some swell, no numb    Objective:  BP (!) 155/98   Pulse 61   Temp 98.4 °F (36.9 °C)   Resp 15   Ht 5' 4\" (1.626 m)   Wt 255 lb (115.7 kg)   BMI 43.77 kg/m²   GEN: Appears stated age in NAD. HEAD:  Normocephalic, atraumatic. NEURO:  Sensation intact light touch B/L lower extremities. MS:  Strength normal throughout upper and lower extremities B/L.   left ankle/foot:  Positive tenderness at peroneus longus/brevis. Negative for tenderness at malleoli, base 5th. Anterior drawer test negative. Talar tilt negative. Kleiger test negative. Syndesmosis squeeze negative. negative fibular head tenderness. Fibular head motion normal. Gait normal.  no clubbing/cyanosis. ROM normal.  EXT: no clubbing/cyanosis. no edema. SKIN: Warm/dry without rash. Assessment/Plan:     ICD-10-CM ICD-9-CM    1. Left ankle tendinitis M77.52 727.06 diclofenac (VOLTAREN) 1 % gel   2. Osteoarthrosis, localized, primary, ankle or foot, left M19.072 715.17 diclofenac (VOLTAREN) 1 % gel       Patient (or guardian if minor) verbalizes understanding of evaluation and plan. Will compete form for HRT and Rx voltaren gel and continue stretch lower leg muscles as demo and RTC as needed.

## 2018-12-06 ENCOUNTER — OFFICE VISIT (OUTPATIENT)
Dept: FAMILY MEDICINE CLINIC | Age: 53
End: 2018-12-06

## 2018-12-06 VITALS
HEART RATE: 68 BPM | RESPIRATION RATE: 12 BRPM | SYSTOLIC BLOOD PRESSURE: 130 MMHG | HEIGHT: 64 IN | DIASTOLIC BLOOD PRESSURE: 86 MMHG | BODY MASS INDEX: 44.05 KG/M2 | TEMPERATURE: 96.9 F | WEIGHT: 258 LBS | OXYGEN SATURATION: 95 %

## 2018-12-06 DIAGNOSIS — M19.072 PRIMARY OSTEOARTHRITIS OF LEFT ANKLE: ICD-10-CM

## 2018-12-06 DIAGNOSIS — M19.072: ICD-10-CM

## 2018-12-06 DIAGNOSIS — M77.52 LEFT ANKLE TENDINITIS: ICD-10-CM

## 2018-12-06 DIAGNOSIS — L30.4 INTERTRIGO: Primary | ICD-10-CM

## 2018-12-06 RX ORDER — CLOTRIMAZOLE AND BETAMETHASONE DIPROPIONATE 10; .5 MG/ML; MG/ML
LOTION TOPICAL 2 TIMES DAILY
Qty: 30 ML | Refills: 1 | Status: SHIPPED | OUTPATIENT
Start: 2018-12-06

## 2018-12-06 RX ORDER — DICLOFENAC SODIUM 10 MG/G
2 GEL TOPICAL 4 TIMES DAILY
Qty: 200 G | Refills: 1 | Status: SHIPPED | OUTPATIENT
Start: 2018-12-06

## 2018-12-06 NOTE — PATIENT INSTRUCTIONS
As many of you know, [place] is expanding Medicaid in January of 2019. Many residents who applied and were denied in the past will now qualify for Medicaid. Under the new system, qualification for non-disabled, non-pregnant adults will be based on income level. The income cut off is listed below, but the only way to know if you may qualify is to apply. You can submit an application online at www.commonhealth. [place].gov, in person at , or by phone at [phone] Income cut off Single person                                     $16.754  or less per year       ($1,397 or less per month) Family of two                                     $22,715 or less per year         ($1,894 or less per month) Family of three                                   $28.677 or less per year         ($2.391 or less per month) Family of four                                     $34,638 or less per year         ($2,887 or less per month)

## 2018-12-06 NOTE — PROGRESS NOTES
HPI 
Arlene Felix is a 46 y.o. female being seen today for Chief Complaint Patient presents with  Follow-up Zoltan Damian follow up for this pt with chronic ankle pain, diastolic CHF and OA. She states she is doing the ankle stretches from ortho and they do help.   she states that her weight loss has plateaued but she hopes to lose more and get below 200#. She uses the lasix prn but her swelling is much better with weight loss and she keeps an eye on it. Wanting refill on the cream she has taken before for rash under her breasts also refill on voltaren gel Past Medical History:  
Diagnosis Date  Arthritis ROS Patient states that she is feeling well. Denies complaints of chest pain, shortness of breath, swelling of legs, dizziness or weakness. she denies nausea, vomiting or diarrhea. Current Outpatient Medications Medication Sig  
 diclofenac (VOLTAREN) 1 % gel Apply 2 g to affected area four (4) times daily. Left lower leg  clotrimazole-betamethasone (LOTRISONE) 1-0.05 % lotion Apply  to affected area two (2) times a day.  diclofenac EC (VOLTAREN) 75 mg EC tablet Take 1 Tab by mouth two (2) times daily as needed.  hydroCHLOROthiazide (HYDRODIURIL) 12.5 mg tablet Take 1 Tab by mouth daily.  furosemide (LASIX) 20 mg tablet Take 1 Tab by mouth daily.  metoprolol tartrate (LOPRESSOR) 50 mg tablet Take 1 Tab by mouth two (2) times a day.  ferrous sulfate 325 mg (65 mg iron) tablet Take 1 Tab by mouth two (2) times a day. No current facility-administered medications for this visit. PE Visit Vitals /86 (BP 1 Location: Left arm, BP Patient Position: Sitting) Pulse 68 Temp 96.9 °F (36.1 °C) (Temporal) Resp 12 Ht 5' 4\" (1.626 m) Wt 258 lb (117 kg) SpO2 95% BMI 44.29 kg/m² Alert and oriented with normal mood and affect. she is well developed and well nourished . Lungs are clear without wheezing.  Heart rate is regular without murmurs or gallops. There is no lower extremity edema. Mild erythema under breasts. Assessment and Plan: ICD-10-CM ICD-9-CM 1. Intertrigo L30.4 695.89 2. Left ankle tendinitis M77.52 727.06 diclofenac (VOLTAREN) 1 % gel 3. Osteoarthrosis, localized, primary, ankle or foot, left M19.072 715.17 diclofenac (VOLTAREN) 1 % gel 4. Primary osteoarthritis of left ankle M19.072 715.17 Refill lotrisone and voltaren gel Continue weight loss and ankle stretches Follow up 6 mos or prn Fish Pimentel MD

## 2018-12-07 PROBLEM — L30.4 INTERTRIGO: Status: ACTIVE | Noted: 2018-12-07

## 2018-12-07 PROBLEM — M19.072 PRIMARY OSTEOARTHRITIS OF LEFT ANKLE: Status: ACTIVE | Noted: 2018-12-07

## 2019-02-14 ENCOUNTER — OFFICE VISIT (OUTPATIENT)
Dept: ORTHOPEDIC SURGERY | Facility: CLINIC | Age: 54
End: 2019-02-14

## 2019-02-14 VITALS
HEIGHT: 64 IN | BODY MASS INDEX: 46.23 KG/M2 | RESPIRATION RATE: 18 BRPM | DIASTOLIC BLOOD PRESSURE: 88 MMHG | SYSTOLIC BLOOD PRESSURE: 171 MMHG | WEIGHT: 270.8 LBS | OXYGEN SATURATION: 100 % | HEART RATE: 43 BPM | TEMPERATURE: 98.1 F

## 2019-02-14 DIAGNOSIS — M25.561 CHRONIC PAIN OF BOTH KNEES: Primary | ICD-10-CM

## 2019-02-14 DIAGNOSIS — M25.562 CHRONIC PAIN OF BOTH KNEES: Primary | ICD-10-CM

## 2019-02-14 DIAGNOSIS — M22.42 CHONDROMALACIA PATELLAE, LEFT: ICD-10-CM

## 2019-02-14 DIAGNOSIS — M17.0 PRIMARY OSTEOARTHRITIS OF BOTH KNEES: ICD-10-CM

## 2019-02-14 DIAGNOSIS — G89.29 CHRONIC PAIN OF BOTH KNEES: Primary | ICD-10-CM

## 2019-02-14 DIAGNOSIS — M22.41 CHONDROMALACIA OF RIGHT PATELLA: ICD-10-CM

## 2019-02-14 RX ORDER — BETAMETHASONE SODIUM PHOSPHATE AND BETAMETHASONE ACETATE 3; 3 MG/ML; MG/ML
6 INJECTION, SUSPENSION INTRA-ARTICULAR; INTRALESIONAL; INTRAMUSCULAR; SOFT TISSUE ONCE
Qty: 0.5 ML | Refills: 0
Start: 2019-02-14 | End: 2019-02-14

## 2019-02-14 RX ORDER — ACETAMINOPHEN 325 MG/1
TABLET ORAL
COMMUNITY
Start: 2019-01-08

## 2019-02-14 RX ORDER — BUPIVACAINE HYDROCHLORIDE 2.5 MG/ML
8 INJECTION, SOLUTION EPIDURAL; INFILTRATION; INTRACAUDAL ONCE
Qty: 8 ML | Refills: 0
Start: 2019-02-14 | End: 2019-02-14

## 2019-02-14 NOTE — PROGRESS NOTES
Patient: Anat Shine                MRN: [id number]       SSN: [id number] YOB: 1965        AGE: 53 y.o. SEX: female PCP: Briana Lerma MD 
02/14/19 Chief Complaint Patient presents with  Knee Pain Jose  Ankle Pain Left HISTORY:  Anat Shine is a 53 y.o. female who is seen for bilateral knee soreness and left ankle pain. She notes a burning pain in her left ankle. She has been experienencing increasing knee pain for the past few years. She reports no h/o injury. She was previously seen by Dr. Lion Flood. She notes pain with standing, walking, and stair climbing. She has startup pain and difficulty with routine daily activities. She was seen by Dr. Marco A Florence at the [facility] for her foot and ankle pain. She notes significant left ankle pain and soreness. Pain Assessment  2/14/2019 Location of Pain Knee Location Modifiers Left;Right Severity of Pain 0 Quality of Pain - Duration of Pain - Frequency of Pain - Aggravating Factors - Limiting Behavior No  
Relieving Factors - Relieving Factors Comment - Result of Injury - Occupation, etc:  Ms. Suresh Serna  receives social security disability benefits for arthritic pains. She was previously a CNA at several hospitals. She lives in the [facility]. She has a son and a daughter in the [place] area. Current weight is 270 pounds. She is 5'4\" tall. Lab Results Component Value Date/Time Hemoglobin A1c <3.5 (L) 06/15/2017 02:24 PM  
 
Weight Metrics 2/14/2019 12/6/2018 11/5/2018 9/24/2018 9/20/2018 9/6/2018 9/21/2017 Weight 270 lb 12.8 oz 258 lb 255 lb 254 lb 9.6 oz 250 lb 255 lb 273 lb BMI 46.48 kg/m2 44.29 kg/m2 43.77 kg/m2 43.7 kg/m2 42.91 kg/m2 43.77 kg/m2 46.86 kg/m2 Patient Active Problem List  
Diagnosis Code  Chronic pain of both knees M25.561, M25.562, G89.29  
 Localized edema R60.0  Primary osteoarthritis of right knee M17.11  
  Bilateral ankle pain M25.571, M25.572  Obesity, morbid ([address] E66.01  
 Intertrigo L30.4  Primary osteoarthritis of left ankle M19.072 REVIEW OF SYSTEMS: All Below are Negative except: See HPI Constitutional: negative for fever, chills, and weight loss. Cardiovascular: negative for chest pain, claudication, leg swelling, SOB, STINSON Gastrointestinal: Negative for pain, N/V/C/D, Blood in stool or urine, dysuria,  hematuria, incontinence, pelvic pain. Musculoskeletal: See HPI Neurological: Negative for dizziness and weakness. Negative for headaches, Visual changes, confusion, seizures Phychiatric/Behavioral: Negative for depression, memory loss, substance  abuse. Extremities: Negative for hair changes, rash, or skin lesion changes. Hematologic: Negative for bleeding problems, bruising, pallor or swollen lymph  nodes Peripheral Vascular: No calf pain, no circulation deficits. Social History Socioeconomic History  Marital status: SINGLE Spouse name: Not on file  Number of children: Not on file  Years of education: Not on file  Highest education level: Not on file Social Needs  Financial resource strain: Not on file  Food insecurity - worry: Not on file  Food insecurity - inability: Not on file  Transportation needs - medical: Not on file  Transportation needs - non-medical: Not on file Occupational History  Not on file Tobacco Use  Smoking status: Never Smoker  Smokeless tobacco: Never Used Substance and Sexual Activity  Alcohol use: No  
 Drug use: No  
 Sexual activity: Not on file Other Topics Concern  Not on file Social History Narrative  Not on file Allergies Allergen Reactions  Percocet [Oxycodone-Acetaminophen] Nausea and Vomiting  Vicodin [Hydrocodone-Acetaminophen] Nausea and Vomiting Current Outpatient Medications Medication Sig  
 acetaminophen (MAPAP) 325 mg tablet Take  by mouth.  betamethasone (CELESTONE SOLUSPAN) 6 mg/mL injection 1 mL by Intra artICUlar route once for 1 dose.  bupivacaine, PF, (MARCAINE, PF,) 0.25 % (2.5 mg/mL) injection 8 mL by Intra artICUlar route once for 1 dose.  diclofenac (VOLTAREN) 1 % gel Apply 2 g to affected area four (4) times daily. Left lower leg  clotrimazole-betamethasone (LOTRISONE) 1-0.05 % lotion Apply  to affected area two (2) times a day.  metoprolol tartrate (LOPRESSOR) 50 mg tablet Take 1 Tab by mouth two (2) times a day.  furosemide (LASIX) 20 mg tablet Take 1 Tab by mouth daily.  diclofenac EC (VOLTAREN) 75 mg EC tablet Take 1 Tab by mouth two (2) times daily as needed.  hydroCHLOROthiazide (HYDRODIURIL) 12.5 mg tablet Take 1 Tab by mouth daily.  ferrous sulfate 325 mg (65 mg iron) tablet Take 1 Tab by mouth two (2) times a day. No current facility-administered medications for this visit. PHYSICAL EXAMINATION: 
Visit Vitals /88 Pulse (!) 43 Temp 98.1 °F (36.7 °C) (Oral) Resp 18 Ht 5' 4\" (1.626 m) Wt 270 lb 12.8 oz (122.8 kg) SpO2 100% BMI 46.48 kg/m² ORTHO EXAMINATION: 
Examination Right knee Left knee Skin Intact Intact Range of motion 90-15 90-15 Effusion - - Medial joint line tenderness + + Lateral joint line tenderness - - Popliteal tenderness - -  
Osteophytes palpable + + Ofes - - Patella crepitus + + Anterior drawer - - Lateral laxity - - Medial laxity - - Varus deformity - -  
Valgus deformity - - Pretibial edema - - Calf tenderness - - [place] valgus and pes planus TIME OUT: 
Chart reviewed for the following: 
 Pam Steward MD, have reviewed the History, Physical and updated the Allergic reactions for [employer] performed immediately prior to start of procedure: 
Pam Steward MD, have performed the following reviews on Jessie Talamantes prior to the start of the procedure: * Patient was identified by name and date of birth * Agreement on procedure being performed was verified * Risks and Benefits explained to the patient * Procedure site verified and marked as necessary * Patient was positioned for comfort * Consent was obtained Time: 11:40 AM  
 
Date of procedure: 2/14/2019 Procedure performed by:  Kody Whaley MD 
Ms. Domínguez tolerated the procedure well with no complications. RADIOGRAPHS: 
XR ALLIE KNEE 2/14/19 MALIHA IMPRESSION:  Three views - No fractures, no effusion, severe joint space narrowing, + large osteophytes present. IKDC Grade D. Severe varus deformity IMPRESSION:   
  ICD-10-CM ICD-9-CM 1. Chronic pain of both knees M25.561 719.46 AMB POC X-RAY KNEE 3 VIEW  
 M25.562 338.29 betamethasone (CELESTONE SOLUSPAN) 6 mg/mL injection G89.29  BETAMETHASONE ACETATE & SODIUM PHOSPHATE INJECTION 3 MG EA.  
   DRAIN/INJECT LARGE JOINT/BURSA  
   bupivacaine, PF, (MARCAINE, PF,) 0.25 % (2.5 mg/mL) injection PROCEDURE AUTHORIZATION TO  2. Primary osteoarthritis of both knees M17.0 715.16 betamethasone (CELESTONE SOLUSPAN) 6 mg/mL injection BETAMETHASONE ACETATE & SODIUM PHOSPHATE INJECTION 3 MG EA.  
   DRAIN/INJECT LARGE JOINT/BURSA  
   bupivacaine, PF, (MARCAINE, PF,) 0.25 % (2.5 mg/mL) injection PROCEDURE AUTHORIZATION TO   
3. Chondromalacia of right patella M22.41 717.7 betamethasone (CELESTONE SOLUSPAN) 6 mg/mL injection BETAMETHASONE ACETATE & SODIUM PHOSPHATE INJECTION 3 MG EA.  
   DRAIN/INJECT LARGE JOINT/BURSA  
   bupivacaine, PF, (MARCAINE, PF,) 0.25 % (2.5 mg/mL) injection PROCEDURE AUTHORIZATION TO   
4. Chondromalacia patellae, left M22.42 717.7 betamethasone (CELESTONE SOLUSPAN) 6 mg/mL injection BETAMETHASONE ACETATE & SODIUM PHOSPHATE INJECTION 3 MG EA.  
   DRAIN/INJECT LARGE JOINT/BURSA  
   bupivacaine, PF, (MARCAINE, PF,) 0.25 % (2.5 mg/mL) injection PROCEDURE AUTHORIZATION TO   
 
PLAN:  After discussing treatment options, patient's knees were injected with 4 cc Marcaine and 1/2 cc Celestone. She will follow up as needed. There is no need for surgery at this time. She will be referred to Dr. Moose Rogers for ortho foot/ankle consultation.   
 
Scribed by Mame Calloway ([address]) as dictated by Renetta Roman MD

## 2019-03-21 ENCOUNTER — DOCUMENTATION ONLY (OUTPATIENT)
Dept: ORTHOPEDIC SURGERY | Age: 54
End: 2019-03-21

## 2019-04-22 ENCOUNTER — PATIENT OUTREACH (OUTPATIENT)
Dept: FAMILY MEDICINE CLINIC | Age: 54
End: 2019-04-22

## 2019-04-22 RX ORDER — FUROSEMIDE 40 MG/1
40 TABLET ORAL DAILY
COMMUNITY
Start: 2017-08-24

## 2019-04-22 RX ORDER — MELOXICAM 7.5 MG/1
7.5 TABLET ORAL
COMMUNITY
Start: 2019-01-28

## 2019-04-22 NOTE — PROGRESS NOTES
ED Discharge Follow-Up Date/Time:     2019 3:18 PM 
 
Patient presented to [facility]  ED on 4/15/19 and was diagnosed with Bilateral leg edema, chronic pain both knees, Chronic combined systolic and diastolic congestive heart failure. Top Challenges reviewed with the provider Patient has insurance and new PCP Method of communication with provider :staff message Nurse Navigator(NN) contacted the  patient  by telephone to perform post ED discharge assessment. Verified name and  with patient as identifiers. Provided introduction to self, and explanation of the Nurse Navigator role. Patient reported assessment: She is feeling well; denies CP, SOB, edema. She is taking 40mg Lasix daily as prescribed. Pt does not check her weight daily. Patient has insurance now and has had f/u with her new PCP at [facility]. Medication(s):  
New Medications at Discharge: none Changed Medications at Discharge: none Discontinued Medications at Discharge: none There were no barriers to obtaining medications identified at this time. Reviewed discharge instructions and red flags with  patient who voiced understanding. Patient given an opportunity to ask questions and does not have any further questions or concerns at this time. The patient agrees to contact the PCP office for questions related to their healthcare. Offered follow up appointment with PCP: no  
[facility] follow up appointment(s): No future appointments.

## 2019-09-11 ENCOUNTER — HOSPITAL ENCOUNTER (OUTPATIENT)
Dept: MAMMOGRAPHY | Age: 54
Discharge: HOME | End: 2019-09-11
Attending: NURSE PRACTITIONER
Payer: MEDICAID

## 2019-09-11 DIAGNOSIS — Z12.39 SCREENING FOR BREAST CANCER: ICD-10-CM

## 2019-09-11 PROCEDURE — 77067 SCR MAMMO BI INCL CAD: CPT

## 2019-09-25 ENCOUNTER — HOSPITAL ENCOUNTER (OUTPATIENT)
Dept: MAMMOGRAPHY | Age: 54
Discharge: HOME | End: 2019-09-25
Attending: NURSE PRACTITIONER
Payer: MEDICAID

## 2019-09-25 ENCOUNTER — HOSPITAL ENCOUNTER (OUTPATIENT)
Dept: ULTRASOUND IMAGING | Age: 54
Discharge: HOME | End: 2019-09-25
Attending: NURSE PRACTITIONER
Payer: MEDICAID

## 2019-09-25 DIAGNOSIS — R92.2 INCONCLUSIVE MAMMOGRAM: ICD-10-CM

## 2019-09-25 DIAGNOSIS — R92.8 ABNORMALITY OF LEFT BREAST ON SCREENING MAMMOGRAM: ICD-10-CM

## 2019-09-25 PROCEDURE — 77061 BREAST TOMOSYNTHESIS UNI: CPT

## 2020-09-30 ENCOUNTER — TRANSCRIBE ORDER (OUTPATIENT)
Dept: SCHEDULING | Age: 55
End: 2020-09-30

## 2020-09-30 DIAGNOSIS — Z12.31 VISIT FOR SCREENING MAMMOGRAM: Primary | ICD-10-CM

## 2021-03-08 ENCOUNTER — HOSPITAL ENCOUNTER (OUTPATIENT)
Dept: PHYSICAL THERAPY | Age: 56
Discharge: HOME | End: 2021-03-08
Payer: MEDICAID

## 2021-03-08 PROCEDURE — 97162 PT EVAL MOD COMPLEX 30 MIN: CPT

## 2021-03-08 NOTE — PROGRESS NOTES
[facility]  [address] [facility] [address], [address] [phone] fax    Plan of Care/ Statement of Necessity for Physical Therapy Services     Patient name: Javy Frazier Start of Care: 3/8/2021   Referral source: Vivien Dawson MD : 1965    Medical Diagnosis: Low back pain [M54.5]  Pain in left ankle [M25.572]  Payor: [facility] MEDICAID / Plan: [address] / Product Type: Managed Care Medicaid /  Onset Date: over 6 months ago    Treatment Diagnosis: LBP, left ankle pain   Prior Hospitalization: see medical history Provider#: [id number]   Medications: Verified on Patient summary List    Comorbidities: HTN, arthritis   Prior Level of Function: Independent with ADLs and functional tasks with no pain. The Plan of Care and following information is based on the information from the initial evaluation. Assessment/ key information:   Pt is a 61year old female who presents to therapy today with LBP and left ankle pain. Pt states that her symptoms began over 6 months ago with insidious onset. Pt reports that her left ankle pain occurred first and states that the pain is on the lateral aspect of the ankle. She reports worsening of pain with weight bearing and sit to stands. She reports her LBP is mostly in the left side of the back and denies having any radicular symptoms. She states she does use a rollator for balance/stability but reports that she cannot find the pins to keep the handles at the correct height. Pt demonstrated decreased AROM, decreased strength, muscle tightness, and impaired mobility. Increased lumbar lordosis noted in sitting. Increased B LE swelling noted and states she does take medication for swelling. Pt would benefit from physical therapy to improve the above impairments to help the pt return to performing ADLs and functional activities.      Evaluation Complexity History MEDIUM  Complexity : 1-2 comorbidities / personal factors will impact the outcome/ POC ; Examination MEDIUM Complexity : 3 Standardized tests and measures addressing body structure, function, activity limitation and / or participation in recreation  ;Presentation MEDIUM Complexity : Evolving with changing characteristics  ; Clinical Decision Making MEDIUM Complexity : FOTO score of 26-74  Overall Complexity Rating: MEDIUM  Problem List: pain affecting function, decrease ROM, decrease strength, edema affecting function, impaired gait/ balance, decrease ADL/ functional abilitiies, decrease activity tolerance, decrease flexibility/ joint mobility and decrease transfer abilities   Treatment Plan may include any combination of the following: Therapeutic exercise, Therapeutic activities, Neuromuscular re-education, Physical agent/modality, Gait/balance training, Manual therapy, Patient education, Self Care training, Functional mobility training, Home safety training and Stair training  Patient / Family readiness to learn indicated by: asking questions, trying to perform skills and interest  Persons(s) to be included in education: patient (P)  Barriers to Learning/Limitations: None  Patient Goal (s): relieve stiffness in joints  Patient Self Reported Health Status: good  Rehabilitation Potential: good    Short Term Goals: To be accomplished in 2 treatments:  1. Pt will report compliance and independence to HEP to help the pt manage their pain and symptoms. Eval: established  Long Term Goals: To be accomplished in 10 treatments:  1. Pt will increase FOTO score to 49 points to improve ability to perform ADLs. Eval: 44 points  2. Pt will increase AROM l/s EXT to 25-50% of WNL to improve ability to tolerate functional activities. Eval: EXT 0-25% of WNL with pain in the low back. 3. Pt will increase AROM left ankle PF to at least 65 degs, INV to at least 15 degs to improve ability to perform sit to stand transfers with less pain.   Eval: PF 56 degs, INV minimal AROM with pain in the lateral ankles. 4. Pt will be able to ambulate for 5 mins with no LOB, mild to no increased LBP or left ankle pain to improve safety and efficiency with gait and mobility. Eval: reports having increased pain with gait with ambulatoin    Frequency / Duration: Patient to be seen 2 times per week for 10 treatments. Patient/ Caregiver education and instruction: Diagnosis, prognosis, self care, activity modification and exercises   [x]  Plan of care has been reviewed with PTA Rhina Rinne, PT 3/8/2021 12:00 PM  _____________________________________________________________________  I certify that the above Therapy Services are being furnished while the patient is under my care. I agree with the treatment plan and certify that this therapy is necessary.     Physician's Signature:____________Date:_________TIME:________    ** Signature, Date and Time must be completed for valid certification **    Please sign and return to [facility] [address], [address] [phone] fax

## 2021-03-08 NOTE — PROGRESS NOTES
PT DAILY TREATMENT NOTE  10-18    Patient Name: Rick Scott  Date:3/8/2021  : 1965  [x]  Patient  Verified  Payor: [facility] MEDICAID / Plan: [address] / Product Type: Managed Care Medicaid /    In time:12:04  Out time:12:44  Total Treatment Time (min): 40  Visit #: 1 of 10    Treatment Area: Low back pain [M54.5]  Pain in left ankle [M25.572]    SUBJECTIVE  Pain Level (0-10 scale): 0  Any medication changes, allergies to medications, adverse drug reactions, diagnosis change, or new procedure performed?: [x] No    [] Yes (see summary sheet for update)  Subjective functional status/changes:   [] No changes reported  See POC    OBJECTIVE    20 min [x]Eval                  []Re-Eval     10 min Therapeutic Exercise:  [] See flow sheet : HEP instruction and demonstration   Rationale: increase ROM and increase strength to improve the patients ability to tolerate ADLs    10 min Self Care/Home Management: []  See flow sheet : pt education regarding anatomy and physiology of the LEs/spine and how it relates to the pt's condition, pt education on contacting a medical supply store to get the pins/parts for her rollator walker and the appropriate height of the rollator. Rationale: increase ROM, increase strength and decrease pain/symptoms  to improve the patients ability to tolerate functional tasks. With   [x] TE   [] TA   [] neuro   [x] Other: Self Care/Home management Patient Education: [x] Review HEP    [] Progressed/Changed HEP based on:   [] positioning   [] body mechanics   [] transfers   [] heat/ice application    [] other:      Other Objective/Functional Measures: See evaluation. Pain Level (0-10 scale) post treatment: 0    ASSESSMENT/Changes in Function: Pt given HEP handout to perform. Pt understood exercises in HEP handout. Pt demonstrated decreased AROM, decreased strength, muscle tightness, and impaired mobility. Increased lumbar lordosis noted in sitting.  Increased B LE swelling noted and states she does take medication for swelling. Pt would benefit from physical therapy to improve the above impairments to help the pt return to performing ADLs and functional activities. Patient will continue to benefit from skilled PT services to modify and progress therapeutic interventions, address functional mobility deficits, address ROM deficits, address strength deficits, analyze and address soft tissue restrictions, analyze and cue movement patterns, analyze and modify body mechanics/ergonomics, assess and modify postural abnormalities, address imbalance/dizziness and instruct in home and community integration to attain remaining goals. [x]  See Plan of Care  []  See progress note/recertification  []  See Discharge Summary         Progress towards goals / Updated goals:  Short Term Goals: To be accomplished in 2 treatments:  1. Pt will report compliance and independence to HEP to help the pt manage their pain and symptoms. Eval: established  Long Term Goals: To be accomplished in 10 treatments:  1. Pt will increase FOTO score to 49 points to improve ability to perform ADLs. Eval: 44 points  2. Pt will increase AROM l/s EXT to 25-50% of WNL to improve ability to tolerate functional activities. Eval: EXT 0-25% of WNL with pain in the low back. 3. Pt will increase AROM left ankle PF to at least 65 degs, INV to at least 15 degs to improve ability to perform sit to stand transfers with less pain. Eval: PF 56 degs, INV minimal AROM with pain in the lateral ankles. 4. Pt will be able to ambulate for 5 mins with no LOB, mild to no increased LBP or left ankle pain to improve safety and efficiency with gait and mobility.   Eval: reports having increased pain with gait with ambulatoipn    PLAN  [x]  Upgrade activities as tolerated     [x]  Continue plan of care  [x]  Update interventions per flow sheet       []  Discharge due to:_  []  Other:_      Raya Myers PT 3/8/2021  12:00 PM    No future appointments.

## 2021-03-29 ENCOUNTER — APPOINTMENT (OUTPATIENT)
Dept: PHYSICAL THERAPY | Age: 56
End: 2021-03-29
Payer: MEDICAID

## 2021-04-27 ENCOUNTER — HOSPITAL ENCOUNTER (OUTPATIENT)
Dept: PHYSICAL THERAPY | Age: 56
Discharge: HOME | End: 2021-04-27
Payer: MEDICAID

## 2021-04-27 PROCEDURE — 97110 THERAPEUTIC EXERCISES: CPT

## 2021-04-27 PROCEDURE — 97530 THERAPEUTIC ACTIVITIES: CPT

## 2021-04-27 PROCEDURE — 97112 NEUROMUSCULAR REEDUCATION: CPT

## 2021-04-27 NOTE — PROGRESS NOTES
[facility] [address]  [address] [facility] [address], [address] [phone] fax    Progress Note  Patient name: Jameel Miller Start of Care: 3/8/2021   Referral source: Elvia Smallwood MD : 1965                Medical Diagnosis: Low back pain [M54.5]  Pain in left ankle [M25.572]  Payor: [facility] MEDICAID / Plan: [address] / Product Type: Managed Care Medicaid /  Onset Date: over 6 months ago                Treatment Diagnosis: LBP, left ankle pain   Prior Hospitalization: see medical history Provider#: [id number]   Medications: Verified on Patient summary List    Comorbidities: HTN, arthritis   Prior Level of Function: Independent with ADLs and functional tasks with no pain.      Visits from Start of Care: 2    Missed Visits: 1    Established Goals:          Short Term Goals: To be accomplished in 2 treatments:  1. Pt will report compliance and independence to [facility] to help the pt manage their pain and symptoms. Eval: established   MET     Long Term Goals: To be accomplished in 10 treatments:  1. Pt will increase FOTO score to 49 points to improve ability to perform ADLs. Eval: 44 points   No change = 44  2. Pt will increase AROM l/s EXT to 25-50% of WNL to improve ability to tolerate functional activities. Eval: EXT 0-25% of WNL with pain in the low back. MET = WNL  3. Pt will increase AROM left ankle PF to at least 65 degs, INV to at least 15 degs to improve ability to perform sit to stand transfers with less pain. Eval: PF 56 degs, INV minimal AROM with pain in the lateral ankles. No change: PF = 30degrees, IV lacking 5degrees  4. Pt will be able to ambulate for 5 mins with no LOB, mild to no increased LBP or left ankle pain to improve safety and efficiency with gait and mobility.    Eval: reports having increased pain with gait with ambulatoin   Progressing = 5minutes with intermittent LOB     Key Functional Changes:   Functional Gains: improved walking distance, improved sleep, decreased pain  Functional Deficits: left foot pain, left back pain that radiates into the thigh  % improvement: 20%  Pain   Average: 5/10       Best: 6/10 without medication; 5/10 with medication     Worst: 6/10  Patient Goal: \"I want the pain to decrease, have more mobility in my joints; walk more and a longer distance\"    Updated Goals: to be achieved in 4 weeks:  1. Pt will increase FOTO score to 49 points to improve ability to perform ADLs. PN status: No change = 44  2. Pt will increase AROM left ankle PF to at least 40 degs in order to facilitate normalized gait. PN status: No change: PF = 30degrees, IV lacking 5degrees  3. Pt will be able to perform at least 10minutes of standing functional exercises in clinic in order to improve safety and efficiency with gait and mobility to reduce risk of falls   PN status: updated goal: 5minutes of walking at home with intermittent LOB  4. Pt will demonstrates bilateral lateral hip abduction strength to at least 4+/5 in order to normalize gait, decrease LBP, and reduce risk of falls   PN status: new goal: bilaterally 3+/5    ASSESSMENT/RECOMMENDATIONS: Ms. Porsha Alfonso returns to PT for first follow up after evaluation due to insurance authorization and scheduling issues. Pt compliance with HEP has improved her walking tolerance, decreased her pain levels, and improved her quality of sleep. Pt continues to have moderate pain levels, poor balance, and reduced activity tolerance.  Pt will benefit from continued skilled PT in order to address remaining deficits and maximize functional potential.    [x]Continue therapy per initial plan/protocol at a frequency of  2 x per week for 5 weeks  []Continue therapy with the following recommended changes:_____________________      _____________________________________________________________________  []Discontinue therapy progressing towards or have reached established goals  []Discontinue therapy due to lack of appreciable progress towards goals  []Discontinue therapy due to lack of attendance or compliance  []Await Physician's recommendations/decisions regarding therapy  []Other:________________________________________________________________    Thank you for this referral.    Dereck Wahl, PT 4/27/2021 12:01 PM  NOTE TO PHYSICIAN:  [address] AND   FAX TO [facility]: [phone]  If you are unable to process this request in 24 hours please contact our office: [phone]    []  I have read the above report and request that my patient continue as recommended. []  I have read the above report and request that my patient continue therapy with the following changes/special instructions:________________________________________  [] I have read the above report and request that my patient be discharged from therapy.     [de-identified] Signature:____________Date:_________TIME:________     Delio Llanos MD  ** Signature, Date and Time must be completed for valid certification **

## 2021-04-27 NOTE — PROGRESS NOTES
PT DAILY TREATMENT NOTE     Patient Name: Domi Gary  Date:2021  : 1965  [x]  Patient  Verified  Payor: [facility] MEDICAID / Plan: [address] / Product Type: Managed Care Medicaid /    In time:1156  Out time:1238  Total Treatment Time (min): 42  Visit #: 1 of 10    Treatment Area: Low back pain [M54.5]    SUBJECTIVE  Pain Level (0-10 scale): 5  Any medication changes, allergies to medications, adverse drug reactions, diagnosis change, or new procedure performed?: [x] No    [] Yes (see summary sheet for update)  Subjective functional status/changes:   [] No changes reported  See PN     OBJECTIVE    15 min Therapeutic Exercise:  [x] See flow sheet :   Rationale: increase ROM and increase strength to improve the patients ability to perform ADLs    15 min Therapeutic Activity:  [x]  See flow sheet : reassessment; updated HEP   Rationale: increase strength and improve coordination  to improve the patients ability to negotiate home and community      12 min Neuromuscular Re-education:  [x]  See flow sheet : glute and core janae   Rationale: increase strength, improve coordination and increase proprioception  to improve the patients ability to tolerate sustained postures          With   [] TE   [] TA   [] neuro   [] other: Patient Education: [x] Review HEP    [] Progressed/Changed HEP based on:   [] positioning   [] body mechanics   [] transfers   [] heat/ice application    [] other:      Other Objective/Functional Measures: see PN ; B hip abduction = 3+/5     Pain Level (0-10 scale) post treatment: 4    ASSESSMENT/Changes in Function: see PN    Patient will continue to benefit from skilled PT services to modify and progress therapeutic interventions, address functional mobility deficits, address ROM deficits, address strength deficits, analyze and address soft tissue restrictions, analyze and cue movement patterns, analyze and modify body mechanics/ergonomics, assess and modify postural abnormalities, address imbalance/dizziness and instruct in home and community integration to attain remaining goals. []  See Plan of Care  [x]  See progress note/recertification  []  See Discharge Summary         Progress towards goals / Updated goals:  1. Pt will increase FOTO score to 49 points to improve ability to perform ADLs. PN status: No change = 44  2. Pt will increase AROM left ankle PF to at least 40 degs in order to facilitate normalized gait. PN status: No change: PF = 30degrees, IV lacking 5degrees  3. Pt will be able to perform at least 10minutes of standing functional exercises in clinic in order to improve safety and efficiency with gait and mobility to reduce risk of falls   PN status: updated goal: 5minutes of walking at home with intermittent LOB  4.  Pt will demonstrates bilateral lateral hip abduction strength to at least 4+/5 in order to normalize gait, decrease LBP, and reduce risk of falls   PN status: new goal: bilaterally 3+/5    PLAN  [x]  Upgrade activities as tolerated     [x]  Continue plan of care  []  Update interventions per flow sheet       []  Discharge due to:_  []  Other:_      Yuri Kumar, PT 4/27/2021  1:45 PM    Future Appointments   Date Time Provider Tawanna Swanson   4/29/2021 12:00 PM Nereyda Contreras, PTA [facility]

## 2021-04-29 ENCOUNTER — HOSPITAL ENCOUNTER (OUTPATIENT)
Dept: PHYSICAL THERAPY | Age: 56
Discharge: HOME | End: 2021-04-29
Payer: MEDICAID

## 2021-04-29 PROCEDURE — 97112 NEUROMUSCULAR REEDUCATION: CPT

## 2021-04-29 PROCEDURE — 97110 THERAPEUTIC EXERCISES: CPT

## 2021-04-29 PROCEDURE — 97530 THERAPEUTIC ACTIVITIES: CPT

## 2021-04-29 NOTE — PROGRESS NOTES
PT DAILY TREATMENT NOTE     Patient Name: Hedy Post  Date:2021  : 1965  [x]  Patient  Verified  Payor: [facility] MEDICAID / Plan: [address] / Product Type: Managed Care Medicaid /    In time:1200  Out time:1233  Total Treatment Time (min): 33  Visit #: 2 of 10    Treatment Area: Low back pain [M54.5]    SUBJECTIVE  Pain Level (0-10 scale): 4  Any medication changes, allergies to medications, adverse drug reactions, diagnosis change, or new procedure performed?: [x] No    [] Yes (see summary sheet for update)  Subjective functional status/changes:   [] No changes reported  \"My left foot hurts. \"    OBJECTIVE    Modality rationale: patient declined   Min Type Additional Details    [] Estim:  []Unatt       []IFC  []Premod                        []Other:  []w/ice   []w/heat  Position:  Location:    [] Estim: []Att    []TENS instruct  []NMES                    []Other:  []w/US   []w/ice   []w/heat  Position:  Location:    []  Traction: [] Cervical       []Lumbar                       [] Prone          []Supine                       []Intermittent   []Continuous Lbs:  [] before manual  [] after manual    []  Ultrasound: []Continuous   [] Pulsed                           []1MHz   []3MHz W/cm2:  Location:    []  Iontophoresis with dexamethasone         Location: [] Take home patch   [] In clinic    []  Ice     []  heat  []  Ice massage  []  Laser   []  Anodyne Position:  Location:    []  Laser with stim  []  Other:  Position:  Location:    []  Vasopneumatic Device Pressure:       [] lo [] med [] hi   Temperature: [] lo [] med [] hi   [] Skin assessment post-treatment:  []intact []redness- no adverse reaction    []redness  adverse reaction:     10 min Therapeutic Exercise:  [x] See flow sheet :   Rationale: increase ROM and increase strength to improve the patients ability to perform ADLs    13 min Therapeutic Activity:  [x]  See flow sheet :functional standing activities, sit to stand education   Rationale: increase ROM, increase strength, improve coordination, improve balance and increase proprioception  to improve the patients ability to improve mobility and ADL performance     10 min Neuromuscular Re-education:  [x]  See flow sheet : core stabilization and ankle/foot stabilization activiites   Rationale: increase ROM, increase strength, improve coordination, improve balance and increase proprioception  to improve the patients ability to improve mobility and upright posture         With   [x] TE   [x] TA   [x] neuro   [] other: Patient Education: [x] Review HEP    [] Progressed/Changed HEP based on:   [x] positioning   [x] body mechanics   [] transfers   [] heat/ice application    [] other:      Other Objective/Functional Measures:      Pain Level (0-10 scale) post treatment: 0    ASSESSMENT/Changes in Function: Pt was limited with standing tolerance, requiring multiple seated rest breaks, but was able to complete more standing exercises compared to her last visit. Poor TA draw and pelvic dissociation. Needed frequent cuing for safety using her rollator during sit to stands. Patient will continue to benefit from skilled PT services to modify and progress therapeutic interventions, address functional mobility deficits, address ROM deficits, address strength deficits, analyze and address soft tissue restrictions, analyze and cue movement patterns, analyze and modify body mechanics/ergonomics, assess and modify postural abnormalities, address imbalance/dizziness and instruct in home and community integration to attain remaining goals. [x]  See Plan of Care  []  See progress note/recertification  []  See Discharge Summary         Progress towards goals / Updated goals:  1. Pt will increase FOTO score to 49 points to improve ability to perform ADLs. PN status: No change = 44  2. Pt will increase AROM left ankle PF to at least 40 degs in order to facilitate normalized gait. PN status: No change: PF = 30degrees, IV lacking 5degrees  3. Pt will be able to perform at least 10minutes of standing functional exercises in clinic in order to improve safety and efficiency with gait and mobility to reduce risk of falls              PN status: updated goal: 5minutes of walking at home with intermittent LOB  4. Pt will demonstrates bilateral lateral hip abduction strength to at least 4+/5 in order to normalize gait, decrease LBP, and reduce risk of falls              PN status: new goal: bilaterally 3+/5    PLAN  []  Upgrade activities as tolerated     [x]  Continue plan of care  []  Update interventions per flow sheet       []  Discharge due to:_  []  Other:_      Chloe Hamilton, LUZ, CSCS 4/29/2021  12:36 PM    No future appointments.

## 2021-05-24 NOTE — PROGRESS NOTES
[facility] [place] [address]  [address], [address] [phone] fax    Discharge Summary  Patient Kim Rondon Start of Care: 3/8/2021   Referral Angeles De Dios MD QII: 0/7/2231                Medical Diagnosis: Low back pain [M54.5]  Pain in left ankle [M25.572]  Payor: [facility] MEDICAID / Plan: [email or web address]  / Product Type: Managed Care Medicaid /  Onset Date: over 6 months ago                Treatment Diagnosis: LBP, left ankle pain   Prior Hospitalization: see medical history Provider#: [id number]   Medications: Verified on Patient summary List    Comorbidities: HTN, arthritis   Prior Level of Function: Independent with ADLs and functional tasks with no pain.          Visits from Start of Care: 3    Missed Visits: 1    Reporting Period : 3/8/21 to 4/29/21    Unable to address goals - not met  Short Term Goals: To be accomplished in 2 treatments:  1. Pt will report compliance and independence to HEP to help the pt manage their pain and symptoms.           Eval: established  Long Term Goals: To be accomplished in 10 treatments:  1. Pt will increase FOTO score to 49 points to improve ability to perform ADLs. Eval: 44 points  2. Pt will increase AROM l/s EXT to 25-50% of WNL to improve ability to tolerate functional activities. Eval: EXT 0-25% of WNL with pain in the low back. 3. Pt will increase AROM left ankle PF to at least 65 degs, INV to at least 15 degs to improve ability to perform sit to stand transfers with less pain. Eval: PF 56 degs, INV minimal AROM with pain in the lateral ankles.   4. Pt will be able to ambulate for 5 mins with no LOB, mild to no increased LBP or left ankle pain to improve safety and efficiency with gait and mobility. Eval: reports having increased pain with gait with ambulatoipn    Assessment/Summary of care: Ms. Jose Salazar was evaluated on 4/29/21 for LBP/left ankle pain , and seen for a total of 4 visits. Patient was last seen in our office 4/29/21 and has not since f/u to schedule additional visits. As patient status is currently unknown, we will discontinue skilled PT services at this time. Should patient require additional PT in the future, we would be happy to continue treatment with updated order from MD.      RECOMMENDATIONS:  [x]Discontinue therapy: []Patient has reached or is progressing toward set goals      [x]Patient is non-compliant or has abdicated      []Due to lack of appreciable progress towards set [address], PT 5/24/2021 10:05 AM    NOTE TO PHYSICIAN:  Please complete the following and fax to: [facility] at [facility] at [phone]  . Retain this original for your records. If you are unable to process this request in   24 hours, please contact our office.      [] I have read the above report and request that my patient continue therapy with the following changes/special instructions:  [] I have read the above report and request that my patient be discharged from therapy    Physician's Signature:____________Date:_________TIME:________     Rafael Ramsay MD  ** Signature, Date and Time must be completed for valid certification **

## 2021-06-02 ENCOUNTER — TRANSCRIBE ORDER (OUTPATIENT)
Dept: SCHEDULING | Age: 56
End: 2021-06-02

## 2021-06-02 DIAGNOSIS — Z12.31 VISIT FOR SCREENING MAMMOGRAM: Primary | ICD-10-CM

## 2022-03-18 PROBLEM — L30.4 INTERTRIGO: Status: ACTIVE | Noted: 2018-12-07

## 2022-03-19 PROBLEM — M25.571 BILATERAL ANKLE PAIN: Status: ACTIVE | Noted: 2017-09-21

## 2022-03-19 PROBLEM — E66.01 OBESITY, MORBID (HCC): Status: ACTIVE | Noted: 2018-09-06

## 2022-03-19 PROBLEM — M17.11 PRIMARY OSTEOARTHRITIS OF RIGHT KNEE: Status: ACTIVE | Noted: 2017-06-15

## 2022-03-19 PROBLEM — M19.072 PRIMARY OSTEOARTHRITIS OF LEFT ANKLE: Status: ACTIVE | Noted: 2018-12-07

## 2022-03-19 PROBLEM — R60.0 LOCALIZED EDEMA: Status: ACTIVE | Noted: 2017-05-04

## 2022-03-19 PROBLEM — M25.562 CHRONIC PAIN OF BOTH KNEES: Status: ACTIVE | Noted: 2017-05-04

## 2022-03-19 PROBLEM — G89.29 CHRONIC PAIN OF BOTH KNEES: Status: ACTIVE | Noted: 2017-05-04

## 2022-03-19 PROBLEM — M25.561 CHRONIC PAIN OF BOTH KNEES: Status: ACTIVE | Noted: 2017-05-04

## 2022-03-19 PROBLEM — M25.572 BILATERAL ANKLE PAIN: Status: ACTIVE | Noted: 2017-09-21

## 2023-05-18 RX ORDER — FUROSEMIDE 40 MG/1
40 TABLET ORAL DAILY
COMMUNITY
Start: 2017-08-24

## 2023-05-18 RX ORDER — ACETAMINOPHEN 325 MG/1
TABLET ORAL
COMMUNITY
Start: 2019-01-08

## 2023-05-18 RX ORDER — FERROUS SULFATE 325(65) MG
325 TABLET ORAL 2 TIMES DAILY
COMMUNITY
Start: 2017-09-21

## 2023-05-18 RX ORDER — METOPROLOL TARTRATE 50 MG/1
50 TABLET, FILM COATED ORAL 2 TIMES DAILY
COMMUNITY
Start: 2017-09-21

## 2023-05-18 RX ORDER — DICLOFENAC SODIUM 75 MG/1
75 TABLET, DELAYED RELEASE ORAL 2 TIMES DAILY PRN
COMMUNITY
Start: 2018-09-24

## 2023-05-18 RX ORDER — CLOTRIMAZOLE AND BETAMETHASONE DIPROPIONATE 10; .5 MG/ML; MG/ML
LOTION TOPICAL 2 TIMES DAILY
COMMUNITY
Start: 2018-12-06

## 2023-05-18 RX ORDER — HYDROCHLOROTHIAZIDE 12.5 MG/1
12.5 TABLET ORAL DAILY
COMMUNITY
Start: 2018-09-09

## 2023-05-18 RX ORDER — MELOXICAM 7.5 MG/1
7.5 TABLET ORAL
COMMUNITY
Start: 2019-01-28